# Patient Record
Sex: FEMALE | Race: WHITE | NOT HISPANIC OR LATINO
[De-identification: names, ages, dates, MRNs, and addresses within clinical notes are randomized per-mention and may not be internally consistent; named-entity substitution may affect disease eponyms.]

---

## 2017-03-06 ENCOUNTER — RX RENEWAL (OUTPATIENT)
Age: 82
End: 2017-03-06

## 2017-03-31 ENCOUNTER — RX RENEWAL (OUTPATIENT)
Age: 82
End: 2017-03-31

## 2017-04-03 ENCOUNTER — RX RENEWAL (OUTPATIENT)
Age: 82
End: 2017-04-03

## 2017-05-31 ENCOUNTER — RX RENEWAL (OUTPATIENT)
Age: 82
End: 2017-05-31

## 2017-07-17 ENCOUNTER — EMERGENCY (EMERGENCY)
Facility: HOSPITAL | Age: 82
LOS: 1 days | Discharge: LEFT BEFORE DISCHARGE | End: 2017-07-17
Attending: EMERGENCY MEDICINE | Admitting: EMERGENCY MEDICINE
Payer: MEDICARE

## 2017-07-17 VITALS
RESPIRATION RATE: 16 BRPM | TEMPERATURE: 98 F | DIASTOLIC BLOOD PRESSURE: 78 MMHG | WEIGHT: 117.95 LBS | SYSTOLIC BLOOD PRESSURE: 167 MMHG | OXYGEN SATURATION: 98 % | HEART RATE: 63 BPM

## 2017-07-17 VITALS
DIASTOLIC BLOOD PRESSURE: 77 MMHG | RESPIRATION RATE: 16 BRPM | TEMPERATURE: 98 F | SYSTOLIC BLOOD PRESSURE: 166 MMHG | HEART RATE: 56 BPM | OXYGEN SATURATION: 98 %

## 2017-07-17 DIAGNOSIS — R07.89 OTHER CHEST PAIN: ICD-10-CM

## 2017-07-17 DIAGNOSIS — I10 ESSENTIAL (PRIMARY) HYPERTENSION: ICD-10-CM

## 2017-07-17 DIAGNOSIS — Z86.73 PERSONAL HISTORY OF TRANSIENT ISCHEMIC ATTACK (TIA), AND CEREBRAL INFARCTION WITHOUT RESIDUAL DEFICITS: ICD-10-CM

## 2017-07-17 DIAGNOSIS — Z98.89 OTHER SPECIFIED POSTPROCEDURAL STATES: Chronic | ICD-10-CM

## 2017-07-17 LAB
ALBUMIN SERPL ELPH-MCNC: 3.5 G/DL — SIGNIFICANT CHANGE UP (ref 3.3–5)
ALP SERPL-CCNC: 58 U/L — SIGNIFICANT CHANGE UP (ref 40–120)
ALT FLD-CCNC: 16 U/L — SIGNIFICANT CHANGE UP (ref 12–78)
ANION GAP SERPL CALC-SCNC: 6 MMOL/L — SIGNIFICANT CHANGE UP (ref 5–17)
APTT BLD: 28.5 SEC — SIGNIFICANT CHANGE UP (ref 27.5–37.4)
AST SERPL-CCNC: 17 U/L — SIGNIFICANT CHANGE UP (ref 15–37)
BASOPHILS # BLD AUTO: 0.1 K/UL — SIGNIFICANT CHANGE UP (ref 0–0.2)
BASOPHILS NFR BLD AUTO: 0.8 % — SIGNIFICANT CHANGE UP (ref 0–2)
BILIRUB SERPL-MCNC: 0.4 MG/DL — SIGNIFICANT CHANGE UP (ref 0.2–1.2)
BUN SERPL-MCNC: 20 MG/DL — SIGNIFICANT CHANGE UP (ref 7–23)
CALCIUM SERPL-MCNC: 9.1 MG/DL — SIGNIFICANT CHANGE UP (ref 8.5–10.1)
CHLORIDE SERPL-SCNC: 109 MMOL/L — HIGH (ref 96–108)
CK MB BLD-MCNC: <1.3 % — SIGNIFICANT CHANGE UP (ref 0–3.5)
CK MB CFR SERPL CALC: <0.5 NG/ML — SIGNIFICANT CHANGE UP (ref 0–3.6)
CK SERPL-CCNC: 39 U/L — SIGNIFICANT CHANGE UP (ref 26–192)
CO2 SERPL-SCNC: 26 MMOL/L — SIGNIFICANT CHANGE UP (ref 22–31)
CREAT SERPL-MCNC: 1.2 MG/DL — SIGNIFICANT CHANGE UP (ref 0.5–1.3)
EOSINOPHIL # BLD AUTO: 0.2 K/UL — SIGNIFICANT CHANGE UP (ref 0–0.5)
EOSINOPHIL NFR BLD AUTO: 1.6 % — SIGNIFICANT CHANGE UP (ref 0–6)
GLUCOSE SERPL-MCNC: 103 MG/DL — HIGH (ref 70–99)
HCT VFR BLD CALC: 44.5 % — SIGNIFICANT CHANGE UP (ref 34.5–45)
HGB BLD-MCNC: 14.6 G/DL — SIGNIFICANT CHANGE UP (ref 11.5–15.5)
INR BLD: 0.99 RATIO — SIGNIFICANT CHANGE UP (ref 0.88–1.16)
LYMPHOCYTES # BLD AUTO: 2 K/UL — SIGNIFICANT CHANGE UP (ref 1–3.3)
LYMPHOCYTES # BLD AUTO: 21.8 % — SIGNIFICANT CHANGE UP (ref 13–44)
MCHC RBC-ENTMCNC: 30.3 PG — SIGNIFICANT CHANGE UP (ref 27–34)
MCHC RBC-ENTMCNC: 32.8 GM/DL — SIGNIFICANT CHANGE UP (ref 32–36)
MCV RBC AUTO: 92.5 FL — SIGNIFICANT CHANGE UP (ref 80–100)
MONOCYTES # BLD AUTO: 0.8 K/UL — SIGNIFICANT CHANGE UP (ref 0–0.9)
MONOCYTES NFR BLD AUTO: 8.9 % — SIGNIFICANT CHANGE UP (ref 1–9)
NEUTROPHILS # BLD AUTO: 6.1 K/UL — SIGNIFICANT CHANGE UP (ref 1.8–7.4)
NEUTROPHILS NFR BLD AUTO: 66.9 % — SIGNIFICANT CHANGE UP (ref 43–77)
PLATELET # BLD AUTO: 183 K/UL — SIGNIFICANT CHANGE UP (ref 150–400)
POTASSIUM SERPL-MCNC: 4.2 MMOL/L — SIGNIFICANT CHANGE UP (ref 3.5–5.3)
POTASSIUM SERPL-SCNC: 4.2 MMOL/L — SIGNIFICANT CHANGE UP (ref 3.5–5.3)
PROT SERPL-MCNC: 6.7 G/DL — SIGNIFICANT CHANGE UP (ref 6–8.3)
PROTHROM AB SERPL-ACNC: 10.8 SEC — SIGNIFICANT CHANGE UP (ref 9.8–12.7)
RBC # BLD: 4.81 M/UL — SIGNIFICANT CHANGE UP (ref 3.8–5.2)
RBC # FLD: 13.2 % — SIGNIFICANT CHANGE UP (ref 10.3–14.5)
SODIUM SERPL-SCNC: 141 MMOL/L — SIGNIFICANT CHANGE UP (ref 135–145)
TROPONIN I SERPL-MCNC: <.015 NG/ML — SIGNIFICANT CHANGE UP (ref 0.01–0.04)
WBC # BLD: 9.2 K/UL — SIGNIFICANT CHANGE UP (ref 3.8–10.5)
WBC # FLD AUTO: 9.2 K/UL — SIGNIFICANT CHANGE UP (ref 3.8–10.5)

## 2017-07-17 PROCEDURE — 93005 ELECTROCARDIOGRAM TRACING: CPT

## 2017-07-17 PROCEDURE — 85730 THROMBOPLASTIN TIME PARTIAL: CPT

## 2017-07-17 PROCEDURE — 93010 ELECTROCARDIOGRAM REPORT: CPT

## 2017-07-17 PROCEDURE — 84484 ASSAY OF TROPONIN QUANT: CPT

## 2017-07-17 PROCEDURE — 71010: CPT | Mod: 26

## 2017-07-17 PROCEDURE — 99284 EMERGENCY DEPT VISIT MOD MDM: CPT | Mod: 25

## 2017-07-17 PROCEDURE — 85027 COMPLETE CBC AUTOMATED: CPT

## 2017-07-17 PROCEDURE — 85610 PROTHROMBIN TIME: CPT

## 2017-07-17 PROCEDURE — 99285 EMERGENCY DEPT VISIT HI MDM: CPT

## 2017-07-17 PROCEDURE — 80053 COMPREHEN METABOLIC PANEL: CPT

## 2017-07-17 PROCEDURE — 71045 X-RAY EXAM CHEST 1 VIEW: CPT

## 2017-07-17 PROCEDURE — 82553 CREATINE MB FRACTION: CPT

## 2017-07-17 PROCEDURE — 82550 ASSAY OF CK (CPK): CPT

## 2017-07-17 RX ORDER — SODIUM CHLORIDE 9 MG/ML
3 INJECTION INTRAMUSCULAR; INTRAVENOUS; SUBCUTANEOUS ONCE
Qty: 0 | Refills: 0 | Status: COMPLETED | OUTPATIENT
Start: 2017-07-17 | End: 2017-07-17

## 2017-07-17 RX ORDER — ASPIRIN/CALCIUM CARB/MAGNESIUM 324 MG
325 TABLET ORAL ONCE
Qty: 0 | Refills: 0 | Status: COMPLETED | OUTPATIENT
Start: 2017-07-17 | End: 2017-07-17

## 2017-07-17 RX ADMIN — SODIUM CHLORIDE 3 MILLILITER(S): 9 INJECTION INTRAMUSCULAR; INTRAVENOUS; SUBCUTANEOUS at 12:24

## 2017-07-17 NOTE — ED PROVIDER NOTE - ENMT, MLM
Airway patent, Nasal mucosa clear. Mouth with normal mucosa. Throat has no vesicles, no oropharyngeal exudates and uvula is midline. MM Moist. Non-toxic, well appearing. Neck supple. No meningeal signs.

## 2017-07-17 NOTE — ED ADULT NURSE NOTE - EXPLANATION OF PATIENT'S REASON FOR LEAVING
patient states "I feel better and I don't want to get more tests and I want to go home." patient and daughter of patient educated on the risk of leaving AMA which can result in death and verbalized understanding of this risk. patient states "I feel better and I don't want to get more tests and I want to go home." patient and daughter of patient educated by myself and Dr. Rangel on the risk of leaving AMA which can result in death and verbalized understanding of this risk.

## 2017-07-17 NOTE — ED PROVIDER NOTE - CHPI ED SYMPTOMS NEG
no syncope/no diaphoresis/no shortness of breath/no chills/no fever/no nausea/no dizziness/no cough/no vomiting/no back pain

## 2017-07-17 NOTE — ED ADULT NURSE REASSESSMENT NOTE - NS ED NURSE REASSESS COMMENT FT1
patient denies chest pain, dizziness, palpitations, nausea.   IV intact.   NSR on continuos cardiac monitor.  daughter at bedside. patient ambulated in ED, steady gait noted.

## 2017-07-17 NOTE — ED ADULT NURSE REASSESSMENT NOTE - NS ED NURSE REASSESS COMMENT FT1
patient ambulated to bathroom and back to bed.  patient states "I feel better."   patient denies chest pain, dizziness, palpitatoins, nausea.   IV intact.   NSR on continuos cardiac monitor.  daughter at bedside.

## 2017-07-17 NOTE — ED PROVIDER NOTE - OBJECTIVE STATEMENT
89 yo F p/w developed chest discomfort to mid / upper chest since ~ 7 am today. no fever/chills. No dyspnea. No agg/allev factors. No recent illness. No SOB. No recent LORENZO / easy fatigue. no numb/ting/focal weak. NO agg/allev factors. NO other inj or co.

## 2017-07-17 NOTE — ED PROVIDER NOTE - PROGRESS NOTE DETAILS
Had an at length discussion with patient.  Patient wishes to leave at this time, she will not wait for cardiology consultation or any further tests..  The patient understands that they are leaving against medical advice despite the risk of missing a potentially serious diagnosis which may lead to injury, disability and/or death.  I discussed with the patient which tests would need to be performed and what type of monitoring would be necessary for the patient as well.  I was unable to convince patient to stay for further workup.  The patient was given an opportunity to ask any questions as well.   The patient demonstrates understanding of all risks, is awake and alert and demonstrates competance to make medical decisions.  The patient understands that they may return at any time if desired. Discussed the importance of close, prompt medical follow-up.  Also present at bedside for discussions: Daughter Franchesca

## 2017-07-17 NOTE — ED ADULT NURSE NOTE - OBJECTIVE STATEMENT
89yo female presents to ED alert and oriented X 4.   steady gait noted.  patient c/o "indigestion since 7am." patient c/o sternal chest pain, intermittently since 7am.  patient denies headache, palpitations, blurred vision, fever, chills, cough, dizziness.   respirations even and unlabored.  ekg performed by molly and reviewed by Dr. Rangel.   NSR on [employer] cardiac monitor.   family at bedside.

## 2017-08-28 ENCOUNTER — RX RENEWAL (OUTPATIENT)
Age: 82
End: 2017-08-28

## 2018-06-09 ENCOUNTER — EMERGENCY (EMERGENCY)
Facility: HOSPITAL | Age: 83
LOS: 1 days | Discharge: LEFT BEFORE DISCHARGE | End: 2018-06-09
Admitting: EMERGENCY MEDICINE

## 2018-06-09 VITALS
HEART RATE: 70 BPM | RESPIRATION RATE: 14 BRPM | HEIGHT: 60 IN | TEMPERATURE: 98 F | DIASTOLIC BLOOD PRESSURE: 74 MMHG | OXYGEN SATURATION: 98 % | SYSTOLIC BLOOD PRESSURE: 170 MMHG | WEIGHT: 117.95 LBS

## 2018-06-09 VITALS
SYSTOLIC BLOOD PRESSURE: 154 MMHG | TEMPERATURE: 98 F | OXYGEN SATURATION: 98 % | HEART RATE: 61 BPM | DIASTOLIC BLOOD PRESSURE: 83 MMHG | RESPIRATION RATE: 14 BRPM

## 2018-06-09 DIAGNOSIS — Z98.89 OTHER SPECIFIED POSTPROCEDURAL STATES: Chronic | ICD-10-CM

## 2018-06-09 NOTE — ED ADULT NURSE NOTE - OBJECTIVE STATEMENT
patient presenting to ED with complain of weakness denies pain or any other discomfort. will continue to monitor .

## 2018-06-09 NOTE — ED ADULT NURSE REASSESSMENT NOTE - NS ED NURSE REASSESS COMMENT FT1
patient and family requesting to leave ED, patient verbalized feeling improved, patient encouraged to wait for MD evaluation, patient and family insist on leave. patient seen ambulatory without assist, escorted out of ED by two family members. patient encouraged to return to ED if symptoms persists or return, patient verbalized understanding.

## 2018-06-09 NOTE — ED ADULT NURSE NOTE - CHPI ED SYMPTOMS NEG
no nausea/no tingling/no pain/no chills/no decreased eating/drinking/no dizziness/no vomiting/no numbness/no fever

## 2019-08-01 ENCOUNTER — TRANSCRIPTION ENCOUNTER (OUTPATIENT)
Age: 84
End: 2019-08-01

## 2019-10-17 ENCOUNTER — EMERGENCY (EMERGENCY)
Facility: HOSPITAL | Age: 84
LOS: 1 days | Discharge: HOME | End: 2019-10-17
Attending: EMERGENCY MEDICINE | Admitting: EMERGENCY MEDICINE
Payer: MEDICARE

## 2019-10-17 VITALS
SYSTOLIC BLOOD PRESSURE: 168 MMHG | RESPIRATION RATE: 16 BRPM | TEMPERATURE: 98 F | OXYGEN SATURATION: 98 % | HEART RATE: 77 BPM | DIASTOLIC BLOOD PRESSURE: 88 MMHG

## 2019-10-17 VITALS
OXYGEN SATURATION: 96 % | RESPIRATION RATE: 18 BRPM | HEART RATE: 81 BPM | SYSTOLIC BLOOD PRESSURE: 172 MMHG | WEIGHT: 119.93 LBS | DIASTOLIC BLOOD PRESSURE: 89 MMHG | TEMPERATURE: 98 F

## 2019-10-17 DIAGNOSIS — Z98.89 OTHER SPECIFIED POSTPROCEDURAL STATES: Chronic | ICD-10-CM

## 2019-10-17 LAB
ALBUMIN SERPL ELPH-MCNC: 3.6 G/DL — SIGNIFICANT CHANGE UP (ref 3.3–5)
ALP SERPL-CCNC: 108 U/L — SIGNIFICANT CHANGE UP (ref 40–120)
ALT FLD-CCNC: 19 U/L — SIGNIFICANT CHANGE UP (ref 12–78)
ANION GAP SERPL CALC-SCNC: 8 MMOL/L — SIGNIFICANT CHANGE UP (ref 5–17)
APPEARANCE UR: CLEAR — SIGNIFICANT CHANGE UP
APTT BLD: 31.3 SEC — SIGNIFICANT CHANGE UP (ref 28.5–37)
AST SERPL-CCNC: 32 U/L — SIGNIFICANT CHANGE UP (ref 15–37)
BASOPHILS # BLD AUTO: 0.04 K/UL — SIGNIFICANT CHANGE UP (ref 0–0.2)
BASOPHILS NFR BLD AUTO: 0.3 % — SIGNIFICANT CHANGE UP (ref 0–2)
BILIRUB SERPL-MCNC: 0.9 MG/DL — SIGNIFICANT CHANGE UP (ref 0.2–1.2)
BILIRUB UR-MCNC: NEGATIVE — SIGNIFICANT CHANGE UP
BUN SERPL-MCNC: 20 MG/DL — SIGNIFICANT CHANGE UP (ref 7–23)
CALCIUM SERPL-MCNC: 9.9 MG/DL — SIGNIFICANT CHANGE UP (ref 8.5–10.1)
CHLORIDE SERPL-SCNC: 109 MMOL/L — HIGH (ref 96–108)
CK SERPL-CCNC: 169 U/L — SIGNIFICANT CHANGE UP (ref 26–192)
CO2 SERPL-SCNC: 24 MMOL/L — SIGNIFICANT CHANGE UP (ref 22–31)
COLOR SPEC: YELLOW — SIGNIFICANT CHANGE UP
CREAT SERPL-MCNC: 1.1 MG/DL — SIGNIFICANT CHANGE UP (ref 0.5–1.3)
DIFF PNL FLD: ABNORMAL
EOSINOPHIL # BLD AUTO: 0.04 K/UL — SIGNIFICANT CHANGE UP (ref 0–0.5)
EOSINOPHIL NFR BLD AUTO: 0.3 % — SIGNIFICANT CHANGE UP (ref 0–6)
GLUCOSE SERPL-MCNC: 131 MG/DL — HIGH (ref 70–99)
GLUCOSE UR QL: NEGATIVE — SIGNIFICANT CHANGE UP
HCT VFR BLD CALC: 39.6 % — SIGNIFICANT CHANGE UP (ref 34.5–45)
HGB BLD-MCNC: 12.8 G/DL — SIGNIFICANT CHANGE UP (ref 11.5–15.5)
IMM GRANULOCYTES NFR BLD AUTO: 0.5 % — SIGNIFICANT CHANGE UP (ref 0–1.5)
INR BLD: 1.1 RATIO — SIGNIFICANT CHANGE UP (ref 0.88–1.16)
KETONES UR-MCNC: NEGATIVE — SIGNIFICANT CHANGE UP
LEUKOCYTE ESTERASE UR-ACNC: NEGATIVE — SIGNIFICANT CHANGE UP
LYMPHOCYTES # BLD AUTO: 1.22 K/UL — SIGNIFICANT CHANGE UP (ref 1–3.3)
LYMPHOCYTES # BLD AUTO: 9.4 % — LOW (ref 13–44)
MCHC RBC-ENTMCNC: 28.4 PG — SIGNIFICANT CHANGE UP (ref 27–34)
MCHC RBC-ENTMCNC: 32.3 GM/DL — SIGNIFICANT CHANGE UP (ref 32–36)
MCV RBC AUTO: 87.8 FL — SIGNIFICANT CHANGE UP (ref 80–100)
MONOCYTES # BLD AUTO: 0.95 K/UL — HIGH (ref 0–0.9)
MONOCYTES NFR BLD AUTO: 7.4 % — SIGNIFICANT CHANGE UP (ref 2–14)
NEUTROPHILS # BLD AUTO: 10.6 K/UL — HIGH (ref 1.8–7.4)
NEUTROPHILS NFR BLD AUTO: 82.1 % — HIGH (ref 43–77)
NITRITE UR-MCNC: NEGATIVE — SIGNIFICANT CHANGE UP
NRBC # BLD: 0 /100 WBCS — SIGNIFICANT CHANGE UP (ref 0–0)
PH UR: 6.5 — SIGNIFICANT CHANGE UP (ref 5–8)
PLATELET # BLD AUTO: 257 K/UL — SIGNIFICANT CHANGE UP (ref 150–400)
POTASSIUM SERPL-MCNC: 4.2 MMOL/L — SIGNIFICANT CHANGE UP (ref 3.5–5.3)
POTASSIUM SERPL-SCNC: 4.2 MMOL/L — SIGNIFICANT CHANGE UP (ref 3.5–5.3)
PROT SERPL-MCNC: 7.5 G/DL — SIGNIFICANT CHANGE UP (ref 6–8.3)
PROT UR-MCNC: NEGATIVE — SIGNIFICANT CHANGE UP
PROTHROM AB SERPL-ACNC: 12.5 SEC — SIGNIFICANT CHANGE UP (ref 10–12.9)
RBC # BLD: 4.51 M/UL — SIGNIFICANT CHANGE UP (ref 3.8–5.2)
RBC # FLD: 13.5 % — SIGNIFICANT CHANGE UP (ref 10.3–14.5)
SODIUM SERPL-SCNC: 141 MMOL/L — SIGNIFICANT CHANGE UP (ref 135–145)
SP GR SPEC: 1.01 — SIGNIFICANT CHANGE UP (ref 1.01–1.02)
UROBILINOGEN FLD QL: NEGATIVE — SIGNIFICANT CHANGE UP
WBC # BLD: 12.92 K/UL — HIGH (ref 3.8–10.5)
WBC # FLD AUTO: 12.92 K/UL — HIGH (ref 3.8–10.5)

## 2019-10-17 PROCEDURE — 72170 X-RAY EXAM OF PELVIS: CPT

## 2019-10-17 PROCEDURE — 82550 ASSAY OF CK (CPK): CPT

## 2019-10-17 PROCEDURE — 72125 CT NECK SPINE W/O DYE: CPT

## 2019-10-17 PROCEDURE — 70450 CT HEAD/BRAIN W/O DYE: CPT

## 2019-10-17 PROCEDURE — 70450 CT HEAD/BRAIN W/O DYE: CPT | Mod: 26

## 2019-10-17 PROCEDURE — 87086 URINE CULTURE/COLONY COUNT: CPT

## 2019-10-17 PROCEDURE — 72125 CT NECK SPINE W/O DYE: CPT | Mod: 26

## 2019-10-17 PROCEDURE — 93010 ELECTROCARDIOGRAM REPORT: CPT

## 2019-10-17 PROCEDURE — 73562 X-RAY EXAM OF KNEE 3: CPT

## 2019-10-17 PROCEDURE — 85610 PROTHROMBIN TIME: CPT

## 2019-10-17 PROCEDURE — 71046 X-RAY EXAM CHEST 2 VIEWS: CPT

## 2019-10-17 PROCEDURE — 72170 X-RAY EXAM OF PELVIS: CPT | Mod: 26

## 2019-10-17 PROCEDURE — 71046 X-RAY EXAM CHEST 2 VIEWS: CPT | Mod: 26

## 2019-10-17 PROCEDURE — 93005 ELECTROCARDIOGRAM TRACING: CPT

## 2019-10-17 PROCEDURE — 85027 COMPLETE CBC AUTOMATED: CPT

## 2019-10-17 PROCEDURE — 73562 X-RAY EXAM OF KNEE 3: CPT | Mod: 26,50

## 2019-10-17 PROCEDURE — 99284 EMERGENCY DEPT VISIT MOD MDM: CPT

## 2019-10-17 PROCEDURE — 81001 URINALYSIS AUTO W/SCOPE: CPT

## 2019-10-17 PROCEDURE — 85730 THROMBOPLASTIN TIME PARTIAL: CPT

## 2019-10-17 PROCEDURE — 80053 COMPREHEN METABOLIC PANEL: CPT

## 2019-10-17 PROCEDURE — 99284 EMERGENCY DEPT VISIT MOD MDM: CPT | Mod: 25

## 2019-10-17 PROCEDURE — 36415 COLL VENOUS BLD VENIPUNCTURE: CPT

## 2019-10-17 RX ORDER — NITROFURANTOIN MACROCRYSTAL 50 MG
100 CAPSULE ORAL ONCE
Refills: 0 | Status: COMPLETED | OUTPATIENT
Start: 2019-10-17 | End: 2019-10-17

## 2019-10-17 RX ORDER — NITROFURANTOIN MACROCRYSTAL 50 MG
1 CAPSULE ORAL
Qty: 14 | Refills: 0
Start: 2019-10-17 | End: 2019-10-23

## 2019-10-17 RX ADMIN — Medication 100 MILLIGRAM(S): at 12:51

## 2019-10-17 NOTE — ED ADULT NURSE NOTE - OBJECTIVE STATEMENT
received pt in bed #6b Pt alert to name & place BIBA from donald Pt found sleeping on floor w/ pillow & blanket. Pt states she just feels weak As per daughters pt w/ worsening dementia over the last couple of months. pt w/ scab left elbow & bruise right upper back Pt seen by RAGINI Mijares Will monitor

## 2019-10-17 NOTE — ED PROVIDER NOTE - NSFOLLOWUPINSTRUCTIONS_ED_ALL_ED_FT
Follow up with your Primary Care Physician within the next 2-3 days  Bring a copy of your test results with you to your appointment  Continue your current medication regimen  Return to the Emergency Room if you experience new or worsening symptoms  stay hydrated  take macrobid 2x per day for 1 week

## 2019-10-17 NOTE — ED PROVIDER NOTE - OBJECTIVE STATEMENT
89 y/o female comes in from assisted living with no complaints. patient was supposed to attent PT this morning and did not arrive so facility staff went to patients room and found her on the floor with pillow and blanket reporting she had just decided to sleep on the bed. patient nemesio a fall, denies pain. per daughters who speak with her multiple times per day the patient has been acting herself although note her dementia to be progressing more rapidly over the last few months. no recent illness.

## 2019-10-17 NOTE — ED PROVIDER NOTE - CONSTITUTIONAL, MLM
normal... Well appearing, well nourished, awake, alert, oriented to person, NOT place, time/situation, in no apparent distress.

## 2019-10-17 NOTE — ED ADULT NURSE NOTE - PMH
Hypertension    Macular degeneration    Melanoma    Stroke Dementia    Hypertension    Macular degeneration    Melanoma    Stroke

## 2019-10-17 NOTE — ED PROVIDER NOTE - SKIN, MLM
small area of ecchymosis over the right scapula, appears fairly new, nontender over area and scapula

## 2019-10-17 NOTE — ED PROVIDER NOTE - CLINICAL SUMMARY MEDICAL DECISION MAKING FREE TEXT BOX
89 y/o female found on floor of assisted living. has been in usual state of health per family but months of worsening dementia. patient well appearing with small area of bruising over right scapula without tenderness. alert, following commands without obvious focal deficit. will obtain labs, cth and neck, xrays chest, pelvis and knees and reasses - Maryana Rivera PA-C

## 2019-10-17 NOTE — ED PROVIDER NOTE - PATIENT PORTAL LINK FT
You can access the FollowMyHealth Patient Portal offered by [facility] by registering at the following website: [email or web address]. By joining [facility]’s FollowMyHealth portal, you will also be able to view your health information using other applications (apps) compatible with our system.

## 2019-10-17 NOTE — ED ADULT NURSE NOTE - NSIMPLEMENTINTERV_GEN_ALL_ED
Implemented All Fall Risk Interventions:  [place] to call system. Call bell, personal items and telephone within reach. Instruct patient to call for assistance. Room bathroom lighting operational. Non-slip footwear when patient is off stretcher. Physically safe environment: no spills, clutter or unnecessary equipment. Stretcher in lowest position, wheels locked, appropriate side rails in place. Provide visual cue, wrist band, yellow gown, etc. Monitor gait and stability. Monitor for mental status changes and reorient to person, place, and time. Review medications for side effects contributing to fall risk. Reinforce activity limits and safety measures with patient and family.

## 2019-10-17 NOTE — ED PROVIDER NOTE - ATTENDING CONTRIBUTION TO CARE
Pt is a 89 yo female who presents to the ED with a cc of increased confusion.  PMHx of Dementia    Hypertension, macular degeneration, melanoma, h/o CVA.  Pt family reports that pt confusion has been worsening over the last several months.  Today when she did not arrive for her PT session the staff at the facility went to check on her and found her on the ground with a blanket and pillow.  Pt was unsure how she came to be laying on the ground.  Denies pain.  Was sent to the ED for further work up.  Pt alert to person and place but unsure of year and unable to recall present events.  On exam pt lying in bed NAD, NCAT, PERRL, EOMI, heart RRR, lungs CTA, abd soft NT/ND.  No joint TTP on exam.  No midline C/T/L TTP on exam.  Healing contusion noted to right scapular region.  Will obtain screening labs, x-rays and CT head.  Will monitor pt.  Agree with above plan of care

## 2019-10-17 NOTE — ED PROVIDER NOTE - NEUROLOGICAL, MLM
Alert and oriented, following commands, strength 5/5 in all 4 extremities, CN II- XII intact, no pronator drift

## 2019-10-18 LAB
CULTURE RESULTS: SIGNIFICANT CHANGE UP
SPECIMEN SOURCE: SIGNIFICANT CHANGE UP

## 2020-05-05 ENCOUNTER — OUTPATIENT (OUTPATIENT)
Dept: OUTPATIENT SERVICES | Facility: HOSPITAL | Age: 85
LOS: 1 days | Discharge: STILL ADMITTED OR ADMINISTRATIVE | End: 2020-05-05
Payer: MEDICARE

## 2020-05-05 DIAGNOSIS — Z98.89 OTHER SPECIFIED POSTPROCEDURAL STATES: Chronic | ICD-10-CM

## 2020-05-05 DIAGNOSIS — F03.91 UNSPECIFIED DEMENTIA WITH BEHAVIORAL DISTURBANCE: ICD-10-CM

## 2020-05-05 PROBLEM — F03.90 UNSPECIFIED DEMENTIA WITHOUT BEHAVIORAL DISTURBANCE: Chronic | Status: ACTIVE | Noted: 2019-10-17

## 2020-05-21 ENCOUNTER — OUTPATIENT (OUTPATIENT)
Dept: OUTPATIENT SERVICES | Facility: HOSPITAL | Age: 85
LOS: 1 days | Discharge: HOME | End: 2020-05-21
Payer: MEDICARE

## 2020-05-21 DIAGNOSIS — Z98.89 OTHER SPECIFIED POSTPROCEDURAL STATES: Chronic | ICD-10-CM

## 2020-05-21 PROCEDURE — 99443: CPT | Mod: 95

## 2020-05-22 DIAGNOSIS — H35.30 UNSPECIFIED MACULAR DEGENERATION: ICD-10-CM

## 2020-05-22 DIAGNOSIS — I10 ESSENTIAL (PRIMARY) HYPERTENSION: ICD-10-CM

## 2020-05-22 DIAGNOSIS — E78.5 HYPERLIPIDEMIA, UNSPECIFIED: ICD-10-CM

## 2020-05-22 DIAGNOSIS — F03.91 UNSPECIFIED DEMENTIA WITH BEHAVIORAL DISTURBANCE: ICD-10-CM

## 2020-07-17 NOTE — ED PROVIDER NOTE - TEMPLATE
A couple of options:  1. Can send her to the [facility] testing center, she or we would need to call to set up an appointment for her, I can place the order. From the website:  Community testing is focused on people with at least one COVID-19 symptom or who believe  they have been exposed to COVID-19 in their household or through significant exposure at work  or in public, regardless of their ability to pay. All patients must be pre-registered; providers may  also refer patients directly for testing and place orders. There is no out-of-pocket cost to the  patient for the test. If the patient has insurance, [employer] will be billed. If they do  not have insurance, [employer] will cover the cost. Patients can register by calling [phone] or visiting [email or web address]. 2. There is free/no apt necessary drive up testing that will happen July 28-29th at [facility] from 10am-6pm.    3. I heard from Dr. Grey Kim regarding rapid testing done at [facility]. Not sure if this is local or if she would have to go to [place]. Radha Hilton, can you shed some light on this. Let me know what she chooses to do. Given that her daughter is not my patient, I cannot place orders for her.      Mila Duran
COVID-19 SCREENING    Do you or any of your household members have the following symptoms:  Fever >100.4*F or >38.0*C: No    New or worsening cough, shortness of breath, or sore throat: No    New onset of nausea, vomiting or diarrhea: No    New onset of loss of taste or smell, chills, repeated shaking with chills, muscle pain, or headache: No    Have you, a household member, or another person you have been in contact with tested positive for COVID-19 in the last 14 days?: No    Emergent COVID-19 Symptoms requiring Nurse Triage:  Trouble breathing, Persistent pain or pressure in the chest, New confusion, Inability to wake or stay awake, Bluish lips or face    If any of the above questions are positive and the patient is not having emergent symptoms - order COVID-19 lab test, register, & schedule patient for community testing. Testing can also be ordered on asymptomatic individuals who have been instructed by a public health agency or a health care provider to get a COVID-19 test for the purpose of preventing the spread of infection ([place])    DO NOT schedule any other appointments unless directed by a facility or provider (ex:  being discharged from hospital needing 1st pediatric appointment, TIA clinic patients, etc). Appointments can be canceled, but DO NOT schedule/reschedule patient requested appointments.
Dr Stacy Mena, please review the note below.
I called Deann and gave her the number for the [place] test site so she can schedule a test, the public sites run by the [employer] have taken weeks in some cases to get results to people so I told her this would probably be the fastest way to be tested so she can get up to see her .
Noted, thank you Shante Jefferson.
Patients  has been admitted into [facility] and in order for patient to be part of her husbands care, she needs to have a negative COVID test and daughter, Marylu Walters is asking if there is a way they can get orders put in for a rapid COVID test.  Please call back as soon as possible. Patient is with daughter, Marylu Walters.
Cardiac

## 2020-07-22 PROCEDURE — 99214 OFFICE O/P EST MOD 30 MIN: CPT

## 2020-08-17 ENCOUNTER — INPATIENT (INPATIENT)
Facility: HOSPITAL | Age: 85
LOS: 1 days | Discharge: HOME | DRG: 312 | End: 2020-08-19
Attending: HOSPITALIST | Admitting: HOSPITALIST
Payer: MEDICARE

## 2020-08-17 VITALS
DIASTOLIC BLOOD PRESSURE: 55 MMHG | RESPIRATION RATE: 18 BRPM | SYSTOLIC BLOOD PRESSURE: 97 MMHG | HEIGHT: 62 IN | WEIGHT: 100.09 LBS | TEMPERATURE: 98 F | OXYGEN SATURATION: 98 % | HEART RATE: 53 BPM

## 2020-08-17 DIAGNOSIS — Z98.89 OTHER SPECIFIED POSTPROCEDURAL STATES: Chronic | ICD-10-CM

## 2020-08-17 LAB
ALBUMIN SERPL ELPH-MCNC: 4 G/DL — SIGNIFICANT CHANGE UP (ref 3.3–5)
ALP SERPL-CCNC: 73 U/L — SIGNIFICANT CHANGE UP (ref 40–120)
ALT FLD-CCNC: 7 U/L — LOW (ref 10–45)
ANION GAP SERPL CALC-SCNC: 11 MMOL/L — SIGNIFICANT CHANGE UP (ref 5–17)
AST SERPL-CCNC: 17 U/L — SIGNIFICANT CHANGE UP (ref 10–40)
BASOPHILS # BLD AUTO: 0.04 K/UL — SIGNIFICANT CHANGE UP (ref 0–0.2)
BASOPHILS NFR BLD AUTO: 0.5 % — SIGNIFICANT CHANGE UP (ref 0–2)
BILIRUB SERPL-MCNC: 0.2 MG/DL — SIGNIFICANT CHANGE UP (ref 0.2–1.2)
BUN SERPL-MCNC: 22 MG/DL — SIGNIFICANT CHANGE UP (ref 7–23)
CALCIUM SERPL-MCNC: 10 MG/DL — SIGNIFICANT CHANGE UP (ref 8.4–10.5)
CHLORIDE SERPL-SCNC: 107 MMOL/L — SIGNIFICANT CHANGE UP (ref 96–108)
CO2 SERPL-SCNC: 24 MMOL/L — SIGNIFICANT CHANGE UP (ref 22–31)
CREAT SERPL-MCNC: 1.52 MG/DL — HIGH (ref 0.5–1.3)
EOSINOPHIL # BLD AUTO: 0.22 K/UL — SIGNIFICANT CHANGE UP (ref 0–0.5)
EOSINOPHIL NFR BLD AUTO: 2.5 % — SIGNIFICANT CHANGE UP (ref 0–6)
GLUCOSE SERPL-MCNC: 151 MG/DL — HIGH (ref 70–99)
HCT VFR BLD CALC: 43.5 % — SIGNIFICANT CHANGE UP (ref 34.5–45)
HGB BLD-MCNC: 13.5 G/DL — SIGNIFICANT CHANGE UP (ref 11.5–15.5)
IMM GRANULOCYTES NFR BLD AUTO: 0.5 % — SIGNIFICANT CHANGE UP (ref 0–1.5)
LIDOCAIN IGE QN: 73 U/L — HIGH (ref 7–60)
LYMPHOCYTES # BLD AUTO: 2.15 K/UL — SIGNIFICANT CHANGE UP (ref 1–3.3)
LYMPHOCYTES # BLD AUTO: 24.7 % — SIGNIFICANT CHANGE UP (ref 13–44)
MCHC RBC-ENTMCNC: 28.6 PG — SIGNIFICANT CHANGE UP (ref 27–34)
MCHC RBC-ENTMCNC: 31 GM/DL — LOW (ref 32–36)
MCV RBC AUTO: 92.2 FL — SIGNIFICANT CHANGE UP (ref 80–100)
MONOCYTES # BLD AUTO: 0.66 K/UL — SIGNIFICANT CHANGE UP (ref 0–0.9)
MONOCYTES NFR BLD AUTO: 7.6 % — SIGNIFICANT CHANGE UP (ref 2–14)
NEUTROPHILS # BLD AUTO: 5.6 K/UL — SIGNIFICANT CHANGE UP (ref 1.8–7.4)
NEUTROPHILS NFR BLD AUTO: 64.2 % — SIGNIFICANT CHANGE UP (ref 43–77)
NRBC # BLD: 0 /100 WBCS — SIGNIFICANT CHANGE UP (ref 0–0)
PLATELET # BLD AUTO: 177 K/UL — SIGNIFICANT CHANGE UP (ref 150–400)
POTASSIUM SERPL-MCNC: 4.3 MMOL/L — SIGNIFICANT CHANGE UP (ref 3.5–5.3)
POTASSIUM SERPL-SCNC: 4.3 MMOL/L — SIGNIFICANT CHANGE UP (ref 3.5–5.3)
PROT SERPL-MCNC: 6.1 G/DL — SIGNIFICANT CHANGE UP (ref 6–8.3)
RBC # BLD: 4.72 M/UL — SIGNIFICANT CHANGE UP (ref 3.8–5.2)
RBC # FLD: 13.4 % — SIGNIFICANT CHANGE UP (ref 10.3–14.5)
SODIUM SERPL-SCNC: 142 MMOL/L — SIGNIFICANT CHANGE UP (ref 135–145)
TROPONIN T, HIGH SENSITIVITY RESULT: 10 NG/L — SIGNIFICANT CHANGE UP (ref 0–51)
TROPONIN T, HIGH SENSITIVITY RESULT: 11 NG/L — SIGNIFICANT CHANGE UP (ref 0–51)
WBC # BLD: 8.71 K/UL — SIGNIFICANT CHANGE UP (ref 3.8–10.5)
WBC # FLD AUTO: 8.71 K/UL — SIGNIFICANT CHANGE UP (ref 3.8–10.5)

## 2020-08-17 PROCEDURE — 93010 ELECTROCARDIOGRAM REPORT: CPT

## 2020-08-17 PROCEDURE — 99285 EMERGENCY DEPT VISIT HI MDM: CPT | Mod: CS

## 2020-08-17 PROCEDURE — 99223 1ST HOSP IP/OBS HIGH 75: CPT

## 2020-08-17 PROCEDURE — 73502 X-RAY EXAM HIP UNI 2-3 VIEWS: CPT | Mod: 26,RT

## 2020-08-17 PROCEDURE — 99497 ADVNCD CARE PLAN 30 MIN: CPT | Mod: 25

## 2020-08-17 PROCEDURE — 70450 CT HEAD/BRAIN W/O DYE: CPT | Mod: 26

## 2020-08-17 PROCEDURE — 71045 X-RAY EXAM CHEST 1 VIEW: CPT | Mod: 26

## 2020-08-17 RX ORDER — QUETIAPINE FUMARATE 200 MG/1
25 TABLET, FILM COATED ORAL ONCE
Refills: 0 | Status: DISCONTINUED | OUTPATIENT
Start: 2020-08-17 | End: 2020-08-17

## 2020-08-17 RX ORDER — SODIUM CHLORIDE 9 MG/ML
500 INJECTION INTRAMUSCULAR; INTRAVENOUS; SUBCUTANEOUS ONCE
Refills: 0 | Status: COMPLETED | OUTPATIENT
Start: 2020-08-17 | End: 2020-08-17

## 2020-08-17 RX ORDER — FAMOTIDINE 10 MG/ML
20 INJECTION INTRAVENOUS ONCE
Refills: 0 | Status: DISCONTINUED | OUTPATIENT
Start: 2020-08-17 | End: 2020-08-17

## 2020-08-17 RX ORDER — FAMOTIDINE 10 MG/ML
20 INJECTION INTRAVENOUS ONCE
Refills: 0 | Status: COMPLETED | OUTPATIENT
Start: 2020-08-17 | End: 2020-08-17

## 2020-08-17 RX ADMIN — SODIUM CHLORIDE 500 MILLILITER(S): 9 INJECTION INTRAMUSCULAR; INTRAVENOUS; SUBCUTANEOUS at 23:35

## 2020-08-17 RX ADMIN — FAMOTIDINE 20 MILLIGRAM(S): 10 INJECTION INTRAVENOUS at 22:57

## 2020-08-17 NOTE — ED ADULT NURSE NOTE - OBJECTIVE STATEMENT
pt alert and oriented to person and place but not time, pt reports she is confused as to why she was sent to the hospital, pt reports she has no complaints and feels at her baseline.  pt reports she was brought in from a "community center".  pt is speaking full clear sentences, respirations non-labored, skin warm dry and intact, strong pulses throughout, abd is soft and non-distended. pt moving all extremities spontaneously.

## 2020-08-17 NOTE — ED PROVIDER NOTE - CLINICAL SUMMARY MEDICAL DECISION MAKING FREE TEXT BOX
Syncope a/w CP and vomiting in elderly pt c dementia.  Broad ddx including acute coronary syndrome, dissection, PE, GERD/gastritis, pancreatitis, pna/pnx, medication side effect/misadventure.  Less inclined to believe CVA or infection.  Will need ekg, labs including trop/lipase, CT-A C/A/P, CT head, likely admission.  --BMM Syncope a/w CP and vomiting in elderly pt c dementia.  Broad ddx including acute coronary syndrome, dissection, PE, GERD/gastritis, pancreatitis, pna/pnx, medication side effect/misadventure.  Less inclined to believe CVA or infection.  Patient states she has R hip pain though no falls witnessed, raising c/f dissection.  Will need ekg, labs including trop/lipase, CT-A C/A/P, CT head, likely admission.  --BMM Syncope a/w CP and vomiting in elderly pt c dementia.  Broad ddx including acute coronary syndrome, dissection, PE, GERD/gastritis, pancreatitis, pna/pnx, medication side effect/misadventure.  Less inclined to believe CVA or infection.  Pt has epigastric tenderness to palpation on exam without any other PE findings.  Patient states she has R hip pain though no falls witnessed, somewhat raising c/f dissection though b/l BPs symmetric and no c/o pain currently..  Will need ekg, labs including trop/lipase, CT-A C/A/P, CT head, likely admission.  --BMM

## 2020-08-17 NOTE — ED PROVIDER NOTE - PROGRESS NOTE DETAILS
Hx provided via phone by pt's daughter Franchesca -- [phone]  89 y F h/o HTN, hyperlipidemia, dementia, normal state of health otherwise witnessed tonight to have 1 e/o CP/epigastric pain (reached for chest, said "something's wrong") and syncope x 2 min while sitting on couch.  Normal state of health prior.  No falls associated.  Spontaneous recovery of consciousness, no sz-like activity.  +e/o vomiting/dry heaving x 1 after episode occurred.  States had some pain in chest.  No ams.  Took lopressor 25 x 1, aricept, pravistatin, mertazipine/seroquel earlier in the evening.  No h/o syncope in the past.  Specifies patient is a full code.  --BMM

## 2020-08-17 NOTE — ED PROVIDER NOTE - OBJECTIVE STATEMENT
89yo F with PMH HTN, HLD, dementia BIBEMS from home s/p syncopal episode prior to arrival. History mainly obtained from patient's daughter over phone.  Pt was sitting home watching TV when she complained suddenly of epigastric pain prior to syncopizing, + LOC for approx 2 mins. Did not fall or hit head. When pt woke up she had 1 episode of vomiting. Pt is A&O x 2 to person and place, but reports "I was watching TV and I don't know why I'm here, my daughter made me come." Pt reports RLE pain, no other complaints. No current CP/abd pain/n/v.

## 2020-08-17 NOTE — ED ADULT NURSE NOTE - NSIMPLEMENTINTERV_GEN_ALL_ED
Implemented All Fall with Harm Risk Interventions:  [place] to call system. Call bell, personal items and telephone within reach. Instruct patient to call for assistance. Room bathroom lighting operational. Non-slip footwear when patient is off stretcher. Physically safe environment: no spills, clutter or unnecessary equipment. Stretcher in lowest position, wheels locked, appropriate side rails in place. Provide visual cue, wrist band, yellow gown, etc. Monitor gait and stability. Monitor for mental status changes and reorient to person, place, and time. Review medications for side effects contributing to fall risk. Reinforce activity limits and safety measures with patient and family. Provide visual clues: red socks.

## 2020-08-18 DIAGNOSIS — R55 SYNCOPE AND COLLAPSE: ICD-10-CM

## 2020-08-18 DIAGNOSIS — G30.9 ALZHEIMER'S DISEASE, UNSPECIFIED: ICD-10-CM

## 2020-08-18 DIAGNOSIS — Z71.89 OTHER SPECIFIED COUNSELING: ICD-10-CM

## 2020-08-18 DIAGNOSIS — N17.9 ACUTE KIDNEY FAILURE, UNSPECIFIED: ICD-10-CM

## 2020-08-18 DIAGNOSIS — N39.0 URINARY TRACT INFECTION, SITE NOT SPECIFIED: ICD-10-CM

## 2020-08-18 DIAGNOSIS — I63.89 OTHER CEREBRAL INFARCTION: ICD-10-CM

## 2020-08-18 DIAGNOSIS — Z29.9 ENCOUNTER FOR PROPHYLACTIC MEASURES, UNSPECIFIED: ICD-10-CM

## 2020-08-18 DIAGNOSIS — R11.2 NAUSEA WITH VOMITING, UNSPECIFIED: ICD-10-CM

## 2020-08-18 DIAGNOSIS — I10 ESSENTIAL (PRIMARY) HYPERTENSION: ICD-10-CM

## 2020-08-18 LAB
ANION GAP SERPL CALC-SCNC: 12 MMOL/L — SIGNIFICANT CHANGE UP (ref 5–17)
APPEARANCE UR: CLEAR — SIGNIFICANT CHANGE UP
BACTERIA # UR AUTO: NEGATIVE — SIGNIFICANT CHANGE UP
BILIRUB UR-MCNC: NEGATIVE — SIGNIFICANT CHANGE UP
BUN SERPL-MCNC: 20 MG/DL — SIGNIFICANT CHANGE UP (ref 7–23)
CALCIUM SERPL-MCNC: 10.3 MG/DL — SIGNIFICANT CHANGE UP (ref 8.4–10.5)
CHLORIDE SERPL-SCNC: 108 MMOL/L — SIGNIFICANT CHANGE UP (ref 96–108)
CO2 SERPL-SCNC: 24 MMOL/L — SIGNIFICANT CHANGE UP (ref 22–31)
COLOR SPEC: SIGNIFICANT CHANGE UP
CREAT SERPL-MCNC: 1.31 MG/DL — HIGH (ref 0.5–1.3)
DIFF PNL FLD: NEGATIVE — SIGNIFICANT CHANGE UP
EPI CELLS # UR: 3 /HPF — SIGNIFICANT CHANGE UP
GLUCOSE SERPL-MCNC: 112 MG/DL — HIGH (ref 70–99)
GLUCOSE UR QL: NEGATIVE — SIGNIFICANT CHANGE UP
HCT VFR BLD CALC: 43.9 % — SIGNIFICANT CHANGE UP (ref 34.5–45)
HGB BLD-MCNC: 14.1 G/DL — SIGNIFICANT CHANGE UP (ref 11.5–15.5)
HYALINE CASTS # UR AUTO: 3 /LPF — HIGH (ref 0–2)
KETONES UR-MCNC: NEGATIVE — SIGNIFICANT CHANGE UP
LEUKOCYTE ESTERASE UR-ACNC: ABNORMAL
MCHC RBC-ENTMCNC: 29.1 PG — SIGNIFICANT CHANGE UP (ref 27–34)
MCHC RBC-ENTMCNC: 32.1 GM/DL — SIGNIFICANT CHANGE UP (ref 32–36)
MCV RBC AUTO: 90.5 FL — SIGNIFICANT CHANGE UP (ref 80–100)
NITRITE UR-MCNC: NEGATIVE — SIGNIFICANT CHANGE UP
NRBC # BLD: 0 /100 WBCS — SIGNIFICANT CHANGE UP (ref 0–0)
PH UR: 6 — SIGNIFICANT CHANGE UP (ref 5–8)
PLATELET # BLD AUTO: 165 K/UL — SIGNIFICANT CHANGE UP (ref 150–400)
POTASSIUM SERPL-MCNC: 4.7 MMOL/L — SIGNIFICANT CHANGE UP (ref 3.5–5.3)
POTASSIUM SERPL-SCNC: 4.7 MMOL/L — SIGNIFICANT CHANGE UP (ref 3.5–5.3)
PROT UR-MCNC: NEGATIVE — SIGNIFICANT CHANGE UP
RBC # BLD: 4.85 M/UL — SIGNIFICANT CHANGE UP (ref 3.8–5.2)
RBC # FLD: 13.6 % — SIGNIFICANT CHANGE UP (ref 10.3–14.5)
RBC CASTS # UR COMP ASSIST: 2 /HPF — SIGNIFICANT CHANGE UP (ref 0–4)
SARS-COV-2 IGG SERPL QL IA: NEGATIVE — SIGNIFICANT CHANGE UP
SARS-COV-2 IGM SERPL IA-ACNC: <3.8 AU/ML — SIGNIFICANT CHANGE UP
SARS-COV-2 RNA SPEC QL NAA+PROBE: SIGNIFICANT CHANGE UP
SODIUM SERPL-SCNC: 144 MMOL/L — SIGNIFICANT CHANGE UP (ref 135–145)
SP GR SPEC: 1.01 — LOW (ref 1.01–1.02)
UROBILINOGEN FLD QL: NEGATIVE — SIGNIFICANT CHANGE UP
WBC # BLD: 12.01 K/UL — HIGH (ref 3.8–10.5)
WBC # FLD AUTO: 12.01 K/UL — HIGH (ref 3.8–10.5)
WBC UR QL: 54 /HPF — HIGH (ref 0–5)

## 2020-08-18 PROCEDURE — 99233 SBSQ HOSP IP/OBS HIGH 50: CPT

## 2020-08-18 PROCEDURE — 74019 RADEX ABDOMEN 2 VIEWS: CPT | Mod: 26

## 2020-08-18 RX ORDER — CEFTRIAXONE 500 MG/1
1000 INJECTION, POWDER, FOR SOLUTION INTRAMUSCULAR; INTRAVENOUS ONCE
Refills: 0 | Status: COMPLETED | OUTPATIENT
Start: 2020-08-18 | End: 2020-08-18

## 2020-08-18 RX ORDER — MEMANTINE HYDROCHLORIDE 10 MG/1
10 TABLET ORAL
Refills: 0 | Status: DISCONTINUED | OUTPATIENT
Start: 2020-08-18 | End: 2020-08-19

## 2020-08-18 RX ORDER — METOPROLOL TARTRATE 50 MG
12.5 TABLET ORAL
Refills: 0 | Status: DISCONTINUED | OUTPATIENT
Start: 2020-08-18 | End: 2020-08-18

## 2020-08-18 RX ORDER — CEFTRIAXONE 500 MG/1
INJECTION, POWDER, FOR SOLUTION INTRAMUSCULAR; INTRAVENOUS
Refills: 0 | Status: DISCONTINUED | OUTPATIENT
Start: 2020-08-18 | End: 2020-08-19

## 2020-08-18 RX ORDER — SODIUM CHLORIDE 9 MG/ML
1000 INJECTION, SOLUTION INTRAVENOUS
Refills: 0 | Status: DISCONTINUED | OUTPATIENT
Start: 2020-08-18 | End: 2020-08-18

## 2020-08-18 RX ORDER — ATORVASTATIN CALCIUM 80 MG/1
20 TABLET, FILM COATED ORAL AT BEDTIME
Refills: 0 | Status: DISCONTINUED | OUTPATIENT
Start: 2020-08-18 | End: 2020-08-19

## 2020-08-18 RX ORDER — POLYETHYLENE GLYCOL 3350 17 G/17G
17 POWDER, FOR SOLUTION ORAL DAILY
Refills: 0 | Status: DISCONTINUED | OUTPATIENT
Start: 2020-08-18 | End: 2020-08-19

## 2020-08-18 RX ORDER — DONEPEZIL HYDROCHLORIDE 10 MG/1
10 TABLET, FILM COATED ORAL AT BEDTIME
Refills: 0 | Status: DISCONTINUED | OUTPATIENT
Start: 2020-08-18 | End: 2020-08-19

## 2020-08-18 RX ORDER — QUETIAPINE FUMARATE 200 MG/1
12.5 TABLET, FILM COATED ORAL AT BEDTIME
Refills: 0 | Status: DISCONTINUED | OUTPATIENT
Start: 2020-08-18 | End: 2020-08-19

## 2020-08-18 RX ORDER — ENOXAPARIN SODIUM 100 MG/ML
30 INJECTION SUBCUTANEOUS DAILY
Refills: 0 | Status: DISCONTINUED | OUTPATIENT
Start: 2020-08-18 | End: 2020-08-19

## 2020-08-18 RX ORDER — CEFTRIAXONE 500 MG/1
1000 INJECTION, POWDER, FOR SOLUTION INTRAMUSCULAR; INTRAVENOUS EVERY 24 HOURS
Refills: 0 | Status: DISCONTINUED | OUTPATIENT
Start: 2020-08-19 | End: 2020-08-19

## 2020-08-18 RX ORDER — LANOLIN ALCOHOL/MO/W.PET/CERES
1 CREAM (GRAM) TOPICAL AT BEDTIME
Refills: 0 | Status: DISCONTINUED | OUTPATIENT
Start: 2020-08-18 | End: 2020-08-19

## 2020-08-18 RX ORDER — METOPROLOL TARTRATE 50 MG
25 TABLET ORAL
Refills: 0 | Status: DISCONTINUED | OUTPATIENT
Start: 2020-08-18 | End: 2020-08-19

## 2020-08-18 RX ORDER — MIRTAZAPINE 45 MG/1
15 TABLET, ORALLY DISINTEGRATING ORAL AT BEDTIME
Refills: 0 | Status: DISCONTINUED | OUTPATIENT
Start: 2020-08-18 | End: 2020-08-19

## 2020-08-18 RX ADMIN — MEMANTINE HYDROCHLORIDE 10 MILLIGRAM(S): 10 TABLET ORAL at 18:50

## 2020-08-18 RX ADMIN — MEMANTINE HYDROCHLORIDE 10 MILLIGRAM(S): 10 TABLET ORAL at 06:26

## 2020-08-18 RX ADMIN — POLYETHYLENE GLYCOL 3350 17 GRAM(S): 17 POWDER, FOR SOLUTION ORAL at 14:18

## 2020-08-18 RX ADMIN — SODIUM CHLORIDE 75 MILLILITER(S): 9 INJECTION, SOLUTION INTRAVENOUS at 02:30

## 2020-08-18 RX ADMIN — QUETIAPINE FUMARATE 12.5 MILLIGRAM(S): 200 TABLET, FILM COATED ORAL at 21:59

## 2020-08-18 RX ADMIN — Medication 1 MILLIGRAM(S): at 21:59

## 2020-08-18 RX ADMIN — Medication 12.5 MILLIGRAM(S): at 06:26

## 2020-08-18 RX ADMIN — CEFTRIAXONE 100 MILLIGRAM(S): 500 INJECTION, POWDER, FOR SOLUTION INTRAMUSCULAR; INTRAVENOUS at 18:50

## 2020-08-18 RX ADMIN — DONEPEZIL HYDROCHLORIDE 10 MILLIGRAM(S): 10 TABLET, FILM COATED ORAL at 21:59

## 2020-08-18 RX ADMIN — MIRTAZAPINE 15 MILLIGRAM(S): 45 TABLET, ORALLY DISINTEGRATING ORAL at 21:59

## 2020-08-18 RX ADMIN — ATORVASTATIN CALCIUM 20 MILLIGRAM(S): 80 TABLET, FILM COATED ORAL at 21:59

## 2020-08-18 RX ADMIN — Medication 25 MILLIGRAM(S): at 17:35

## 2020-08-18 NOTE — H&P ADULT - PROBLEM SELECTOR PLAN 2
sCr 52 at admission; unknown baseline but last sCr at 1.1; in setting of N/V prior to admission  Received IVF bolus in ED  Will gently hydrate  Follow up BMP in am

## 2020-08-18 NOTE — PROGRESS NOTE ADULT - PROBLEM SELECTOR PLAN 8
DVT ppx w/ enoxaprin  Fall precautions  Delirium precautions: at high risk for delirium; Frequent reorientation, ensure pt's hearing aids are in use, avoid unnecessary tethers, avoid benzos and anticholinergics, lights on in daytime, off in nighttime

## 2020-08-18 NOTE — H&P ADULT - PROBLEM SELECTOR PROBLEM 6
ACP (advance care planning) Alzheimer's dementia with behavioral disturbance, unspecified timing of dementia onset

## 2020-08-18 NOTE — H&P ADULT - PROBLEM SELECTOR PLAN 5
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

## 2020-08-18 NOTE — H&P ADULT - PROBLEM SELECTOR PLAN 3
Presented w/ hypotension and bradycardia; improved after IVF though remains with HR in 50s (unclear baseline); will restart metop tartate at half home dose (metop tartate 12.5 mg bid) w/ holding parameters: SBP<100 and HR<50 Presented w/ hypotension and bradycardia; self resolved and then received IVF; remains with HR in 50s (unclear baseline); will restart metop tartate at half home dose (metop tartate 12.5 mg bid) w/ holding parameters: SBP<100 and HR<50 Episode of N/V at home; tender to abdominal palpation on initial exam per ED notes; non-tender on my examination; nausea currently improved  - Will obtain Abdomen XR to further assess- stool burden vs blockage

## 2020-08-18 NOTE — H&P ADULT - NSHPSOCIALHISTORY_GEN_ALL_CORE
Never smoker    Since Covid 19, left independent living place  Now lives with daughters Franchesca and Selene (alternatives)    Independent with transfers, walking w/ walker; dependent in some ADLs (dressing, bathing, toileting/grooming, medications) and iADLs  Hx of Dementia w/ hallucinations Never smoker    Since Covid 19, has left independent living place  Now lives with daughters Franchesca and Selene (alternatives between)    Independent with transfers, walking w/ walker; dependent in some ADLs (dressing, bathing, toileting/grooming, medications) and iADLs  Hx of Dementia w/ hallucinations

## 2020-08-18 NOTE — PROGRESS NOTE ADULT - PROBLEM SELECTOR PLAN 7
Associated w/ hallucinations  Continue namenda 10 mg BID and aricept 10 mg qd  continue quetiapine 12.5 mg at bedtime

## 2020-08-18 NOTE — H&P ADULT - PROBLEM SELECTOR PLAN 6
Discussed code status w/ daughter - Prefer Full Code Associated w/ hallucinations  Continue namenda 10 mg BID and aricept 10 mg qd  continue quetiapine 12.5 mg at bedtime

## 2020-08-18 NOTE — H&P ADULT - PROBLEM SELECTOR PLAN 8
DVT ppx w/ enoxaprin  Fall precautions DVT ppx w/ enoxaprin  Fall precautions  Delirium precautions: at high risk for delirium; Frequent reorientation, ensure pt's hearing aids are in use, avoid unnecessary tethers, avoid benzos and anticholinergics, lights on in daytime, off in nighttime

## 2020-08-18 NOTE — PROGRESS NOTE ADULT - PROBLEM SELECTOR PROBLEM 5
Truvada (PrEP) initial consultation completed and shipment confirmed. [facility] will ship [employer] on 4/16 via [employer] for delivery on 4/17. Copay $0.00 - 004. Med list and allergies reviewed and updated in Epic. Pt is not new to treatment and declined full consultation. Pt has been on PrEP for over 2 years. Advised pt of [facility] services. [facility] will call pt for refills when he has approximately 7 days on hand.    Essential hypertension

## 2020-08-18 NOTE — H&P ADULT - ASSESSMENT
89yo F with PMH HTN, HLD, dementia BIBEMS from home s/p syncopal episode prior to arrival.  History mainly obtained from patient's daughter over phone.  Pt was sitting home watching TV when she complained suddenly of epigastric or chest pain prior to synopsizing with LOC for approx 2 - 3 mins. 89yo F with PMH HTN, HLD, dementia BIBEMS from home s/p syncopal episode prior to arrival.  History mainly obtained from patient's daughter over phone.  Pt was sitting home watching TV when she complained suddenly of epigastric or chest pain prior to synopsizing with LOC for approx 2 - 3 mins. Admitted for further workup of syncope and abdominal discomfort. 89yo F with PMH HTN, HLD, dementia BIBEMS from home s/p syncopal episode prior to arrival.  History mainly obtained from patient's daughter over phone.  Pt was sitting home watching TV when she complained suddenly of epigastric or chest pain prior to synopsizing with LOC for approx 2 - 3 mins. Admitted for further workup of syncope.

## 2020-08-18 NOTE — H&P ADULT - NSHPPHYSICALEXAM_GEN_ALL_CORE
PHYSICAL EXAM:   GENERAL: Alert. AO x1-2. No acute distress. Not thin. Not cachectic. Not obese.  HEAD:  Atraumatic. Normocephalic.  EYES: EOMI. PERRLA. Normal conjunctiva/sclera.  ENT: Neck supple. Moist oral mucosa. Not edentulous. No thrush.  LYMPH: Normal supraclavicular/cervical lymph nodes.   CARDIAC: Regular rhythm. Regular rhythm. Not irregularly irregular. S1. S2. No murmur. No rub. No distant heart sounds.  LUNG/CHEST: CTAB. BS equal bilaterally. No wheezes. No rales. No rhonchi.  ABDOMEN: Soft. No tenderness. No distension. No fluid wave. Normal bowel sounds.  BACK: No midline/vertebral tenderness. No flank tenderness.  VASCULAR: +2 b/l radial pulses. Palpable DP pulses.  EXTREMITIES:  No clubbing. No cyanosis. No edema. Moving all 4.  NEUROLOGY: Non-focal exam. Cranial nerves intact. Normal speech. Sensation intact.  PSYCH: Normal behavior. Normal affect.  SKIN: No jaundice. No erythema. No rash/lesion.  Vascular Access:     Vital Signs Last 24 Hrs  T(C): 37 (17 Aug 2020 23:36), Max: 37 (17 Aug 2020 23:36)  T(F): 98.6 (17 Aug 2020 23:36), Max: 98.6 (17 Aug 2020 23:36)  HR: 56 (18 Aug 2020 00:25) (52 - 56)  BP: 159/76 (18 Aug 2020 00:25) (97/55 - 159/76)  BP(mean): --  RR: 19 (18 Aug 2020 00:25) (16 - 19)  SpO2: 97% (18 Aug 2020 00:25) (97% - 100%)    I&O's Summary

## 2020-08-18 NOTE — PROGRESS NOTE ADULT - PROBLEM SELECTOR PLAN 5
Presented w/ hypotension and bradycardia; self resolved and then received IVF; remains with HR in 50s (unclear baseline)  Will resume back on home dose 25mg BID given WCT

## 2020-08-18 NOTE — H&P ADULT - PROBLEM SELECTOR PLAN 1
As above, noted to have LOC 2-3 min we preceeding chest/abdominal pain; no prior episodes, associated w/ N/V x1.  Lipase mildly elevated but no pain on physical exam; CT brain w/o acute abnorm  - Follow up CT chest/abdomen  - Monitor on Tele As above, noted to have LOC 2-3 min we preceeding chest/abdominal pain; no prior episodes, associated w/ N/V x1.  Lipase mildly elevated but no pain on physical exam; CT brain w/o acute abnorm, CXR w/o signs of infection; Trop HS neg x2; EKG w/ sinus eunice, QW in V1 and V2, no acute ST elevations or depressions  - Monitor on Tele  - Orthostatic BP and HR

## 2020-08-18 NOTE — H&P ADULT - NSICDXFAMILYHX_GEN_ALL_CORE_FT
FAMILY HISTORY:  No pertinent family history in first degree relatives FAMILY HISTORY:  No family history of cerebrovascular accident (CVA)

## 2020-08-18 NOTE — PROGRESS NOTE ADULT - PROBLEM SELECTOR PLAN 1
As above, noted to have LOC 2-3 min we preceeding chest/abdominal pain; no prior episodes, associated w/ N/V x1.  Lipase mildly elevated but no pain on physical exam; CT brain w/o acute abnorm, CXR w/o signs of infection; Trop HS neg x2; EKG w/ sinus eunice, QW in V1 and V2, no acute ST elevations or depressions  - Monitor on Tele  - Orthostatic BP and HR

## 2020-08-18 NOTE — H&P ADULT - HISTORY OF PRESENT ILLNESS
Patient's daugher Franchesca note tht they were sitting on the couch when the patient noted she did not feel well and touched center of her chest and went unresponsive. The patient was looking at her (eyes open) but was not responding. This lasted 2*3 minutes and then went back to normal. Sje was drooling.  She also noted to EMS that she ws not feeling and was nauseated. She did not vomit. Had clear spit up. Did not urinate her pants. Patient did not recall episode. No focal deficits after she returned.   No prior occurence of same.  CVA 15 years ago. No residual weakness. 89 year old female with a PMH of dementia w/ behav disturbances (hallucinations), hemorrhagic CVA, hypertension, and macular dengeneration who presents to the ED after a witnessed syncopal episode.   Patient appears well but confused. She notes her children felt she was unwell and brought her "here." She was unable to identify she is at the hospital. History limited regarding event but at present patient denies any pain or discomfort. Denies nausea. Denies urinary pain or increased frequency.     Collateral obtained from patient's daughter, Franchesca. She endorses that they were sitting on the couch watching TV when the patient was stated she did not feel well and touched center of her chest and then suddenly went unresponsive. The patient was looking at her (eyes open) but was not responding. This lasted 2-3 minutes and then patient went back to normal and said "what was I saying." Franchesca notes patient was drooling during the episode. Did not fall or hit head. Franchesca also notes patient stated to EMS that she is nauseated. She did not vomit but had "clear spit up." Franchesca denies micturition or known tongue biting during episode. No focal deficits after she returned.   Patient did not recall episode of passing out. No prior occurrence of same. Denies any complaints from patient during the day.

## 2020-08-18 NOTE — H&P ADULT - NSHPLABSRESULTS_GEN_ALL_CORE
Personally reviewed labs.   Personally reviewed imaging.   Personally reviewed EKG.                             13.5   8.71  )-----------( 177      ( 17 Aug 2020 21:47 )               43.5       08-17    142  |  107  |  22  ----------------------------<  151<H>  4.3   |  24  |  1.52<H>    Ca    10.0      17 Aug 2020 21:47    TPro  6.1  /  Alb  4.0  /  TBili  0.2  /  DBili  x   /  AST  17  /  ALT  7<L>  /  AlkPhos  73  08-17      LIVER FUNCTIONS - ( 17 Aug 2020 21:47 )  Alb: 4.0 g/dL / Pro: 6.1 g/dL / ALK PHOS: 73 U/L / ALT: 7 U/L / AST: 17 U/L / GGT: x               CT head:  IMPRESSION:  No CT evidence of acute intracranial hemorrhage, mass effect or acute territorial infarct.  Cerebral volume loss and chronic ischemic changes are grossly stable from 10/17/2019.      CXR: clear lungs    XR HIP W PELV:   INTERPRETATION:  No acute fracture or dislocation at the hip.

## 2020-08-18 NOTE — H&P ADULT - NSHPREVIEWOFSYSTEMS_GEN_ALL_CORE
losartan 100 mg daily  metoprolol tartate 25 mg bid  dementia: namenda 10 mg bid  aricept 10 mg qd  atorva 20 mg qd qhs  mirtazapine 15 mg qhs  quetiapine 25 mg 1/2 tab at night  melatonin 1 mg qhs Limited due to patient's dementia.  REVIEW OF SYSTEMS:  CONSTITUTIONAL: No weakness. No fevers. No chills. No rigors. No weight loss. No night sweats. No poor appetite.  EYES: No blurry or double vision. No eye pain.  ENT: +hearing aids; No vertigo. No dysphagia. No sore throat. No Sinusitis/rhinorrhea.   NECK: No pain. No stiffness/rigidity.  CARDIAC: No chest pain. No palpitations. No lightheadedness.    RESPIRATORY: No cough. No SOB.    GASTROINTESTINAL: +nausea. No abdominal pain. No vomiting. No diarrhea. No constipation. No melena. No hematochezia.  GENITOURINARY: No dysuria. No frequency. No hesitancy. No hematuria. No oliguria.  NEUROLOGICAL: No numbness/tingling. No focal weakness. No urinary or fecal incontinence. No headache. No unsteady gait.  BACK: No back pain. No flank pain.  EXTREMITIES: No lower extremity edema. Full ROM. No joint pain.  SKIN: No rashes. No itching. No other lesions.  PSYCHIATRIC: No depression. No anxiety. No SI/HI.    All other review of systems is negative unless indicated above.

## 2020-08-18 NOTE — PROGRESS NOTE ADULT - ASSESSMENT
89yo F with PMH HTN, HLD, dementia BIBEMS from home s/p syncopal episode prior to arrival.  History mainly obtained from patient's daughter over phone.  Pt was sitting home watching TV when she complained suddenly of epigastric or chest pain prior to synopsizing with LOC for approx 2 - 3 mins. Admitted for further workup of syncope.

## 2020-08-18 NOTE — PROGRESS NOTE ADULT - PROBLEM SELECTOR PLAN 4
Episode of N/V at home; tender to abdominal palpation on initial exam per ED notes; non-tender on my examination; nausea currently improved  - Will obtain Abdomen XR to further assess- stool burden vs blockage

## 2020-08-18 NOTE — H&P ADULT - COMMENTS
losartan 100 mg daily  metoprolol tartate 25 mg bid  dementia: namenda 10 mg bid  aricept 10 mg qd  atorva 20 mg qd qhs  mirtazapine 15 mg qhs  quetiapine 25 mg 1/2 tab at night  melatonin 1 mg qhs

## 2020-08-18 NOTE — PROGRESS NOTE ADULT - PROBLEM SELECTOR PLAN 3
Cr elevated on admission to 1.5 . per daughter in June around 1.3.  Improved. Will gently hydrate  Follow up BMP in am

## 2020-08-18 NOTE — H&P ADULT - PROBLEM SELECTOR PLAN 4
Prior hx of hemorrhagic stroke 15 yrs ago  Continue atorvastatin Presented w/ hypotension and bradycardia; self resolved and then received IVF; remains with HR in 50s (unclear baseline); will restart metop tartate at half home dose (metop tartate 12.5 mg bid) w/ holding parameters: SBP<100 and HR<50

## 2020-08-18 NOTE — PROGRESS NOTE ADULT - PROBLEM SELECTOR PLAN 2
UA + with LE and WBC.   Patient denies any urinary symptoms but poor historian  d/w daughter at length over phone and bedside. no specific symptoms prior.   Given increased WBC today and UA, altered MS would start Abx.  start Ceftriaxone. check UCx.

## 2020-08-18 NOTE — CHART NOTE - NSCHARTNOTEFT_GEN_A_CORE
Notified by RN of pt noted to have 5 beats WCT up 100s. Pt asymptomatic. Pt seen and examined. Pt AAOx1, poor historian. Dr. Monaco notified, will continue to monitor.     Brad Marr NP  [id number]

## 2020-08-19 ENCOUNTER — TRANSCRIPTION ENCOUNTER (OUTPATIENT)
Age: 85
End: 2020-08-19

## 2020-08-19 VITALS
TEMPERATURE: 98 F | OXYGEN SATURATION: 96 % | DIASTOLIC BLOOD PRESSURE: 70 MMHG | RESPIRATION RATE: 18 BRPM | HEART RATE: 49 BPM | SYSTOLIC BLOOD PRESSURE: 166 MMHG

## 2020-08-19 LAB
ANION GAP SERPL CALC-SCNC: 10 MMOL/L — SIGNIFICANT CHANGE UP (ref 5–17)
BUN SERPL-MCNC: 19 MG/DL — SIGNIFICANT CHANGE UP (ref 7–23)
CALCIUM SERPL-MCNC: 9.9 MG/DL — SIGNIFICANT CHANGE UP (ref 8.4–10.5)
CHLORIDE SERPL-SCNC: 111 MMOL/L — HIGH (ref 96–108)
CO2 SERPL-SCNC: 23 MMOL/L — SIGNIFICANT CHANGE UP (ref 22–31)
CREAT SERPL-MCNC: 1.37 MG/DL — HIGH (ref 0.5–1.3)
CULTURE RESULTS: SIGNIFICANT CHANGE UP
GLUCOSE SERPL-MCNC: 102 MG/DL — HIGH (ref 70–99)
HCT VFR BLD CALC: 39.1 % — SIGNIFICANT CHANGE UP (ref 34.5–45)
HGB BLD-MCNC: 12.7 G/DL — SIGNIFICANT CHANGE UP (ref 11.5–15.5)
MCHC RBC-ENTMCNC: 29.3 PG — SIGNIFICANT CHANGE UP (ref 27–34)
MCHC RBC-ENTMCNC: 32.5 GM/DL — SIGNIFICANT CHANGE UP (ref 32–36)
MCV RBC AUTO: 90.3 FL — SIGNIFICANT CHANGE UP (ref 80–100)
NRBC # BLD: 0 /100 WBCS — SIGNIFICANT CHANGE UP (ref 0–0)
PLATELET # BLD AUTO: 162 K/UL — SIGNIFICANT CHANGE UP (ref 150–400)
POTASSIUM SERPL-MCNC: 4.1 MMOL/L — SIGNIFICANT CHANGE UP (ref 3.5–5.3)
POTASSIUM SERPL-SCNC: 4.1 MMOL/L — SIGNIFICANT CHANGE UP (ref 3.5–5.3)
RBC # BLD: 4.33 M/UL — SIGNIFICANT CHANGE UP (ref 3.8–5.2)
RBC # FLD: 13.5 % — SIGNIFICANT CHANGE UP (ref 10.3–14.5)
SODIUM SERPL-SCNC: 144 MMOL/L — SIGNIFICANT CHANGE UP (ref 135–145)
SPECIMEN SOURCE: SIGNIFICANT CHANGE UP
WBC # BLD: 8.4 K/UL — SIGNIFICANT CHANGE UP (ref 3.8–10.5)
WBC # FLD AUTO: 8.4 K/UL — SIGNIFICANT CHANGE UP (ref 3.8–10.5)

## 2020-08-19 PROCEDURE — 80048 BASIC METABOLIC PNL TOTAL CA: CPT

## 2020-08-19 PROCEDURE — 99285 EMERGENCY DEPT VISIT HI MDM: CPT | Mod: 25

## 2020-08-19 PROCEDURE — 73502 X-RAY EXAM HIP UNI 2-3 VIEWS: CPT

## 2020-08-19 PROCEDURE — 71045 X-RAY EXAM CHEST 1 VIEW: CPT

## 2020-08-19 PROCEDURE — 84484 ASSAY OF TROPONIN QUANT: CPT

## 2020-08-19 PROCEDURE — 85027 COMPLETE CBC AUTOMATED: CPT

## 2020-08-19 PROCEDURE — 96374 THER/PROPH/DIAG INJ IV PUSH: CPT

## 2020-08-19 PROCEDURE — 81001 URINALYSIS AUTO W/SCOPE: CPT

## 2020-08-19 PROCEDURE — 93005 ELECTROCARDIOGRAM TRACING: CPT

## 2020-08-19 PROCEDURE — 99239 HOSP IP/OBS DSCHRG MGMT >30: CPT

## 2020-08-19 PROCEDURE — 86769 SARS-COV-2 COVID-19 ANTIBODY: CPT

## 2020-08-19 PROCEDURE — 87086 URINE CULTURE/COLONY COUNT: CPT

## 2020-08-19 PROCEDURE — 70450 CT HEAD/BRAIN W/O DYE: CPT

## 2020-08-19 PROCEDURE — 74019 RADEX ABDOMEN 2 VIEWS: CPT

## 2020-08-19 RX ORDER — MEMANTINE HYDROCHLORIDE 10 MG/1
0 TABLET ORAL
Qty: 0 | Refills: 0 | DISCHARGE

## 2020-08-19 RX ORDER — DONEPEZIL HYDROCHLORIDE 10 MG/1
1 TABLET, FILM COATED ORAL
Qty: 0 | Refills: 0 | DISCHARGE
Start: 2020-08-19

## 2020-08-19 RX ORDER — METOPROLOL TARTRATE 50 MG
1 TABLET ORAL
Qty: 0 | Refills: 0 | DISCHARGE
Start: 2020-08-19

## 2020-08-19 RX ORDER — MIRTAZAPINE 45 MG/1
1 TABLET, ORALLY DISINTEGRATING ORAL
Qty: 0 | Refills: 0 | DISCHARGE

## 2020-08-19 RX ORDER — MEMANTINE HYDROCHLORIDE 10 MG/1
1 TABLET ORAL
Qty: 0 | Refills: 0 | DISCHARGE
Start: 2020-08-19

## 2020-08-19 RX ORDER — MIRTAZAPINE 45 MG/1
1 TABLET, ORALLY DISINTEGRATING ORAL
Qty: 0 | Refills: 0 | DISCHARGE
Start: 2020-08-19

## 2020-08-19 RX ORDER — ATORVASTATIN CALCIUM 80 MG/1
1 TABLET, FILM COATED ORAL
Qty: 0 | Refills: 0 | DISCHARGE
Start: 2020-08-19

## 2020-08-19 RX ORDER — MOXIFLOXACIN HYDROCHLORIDE TABLETS, 400 MG 400 MG/1
1 TABLET, FILM COATED ORAL
Qty: 2 | Refills: 0
Start: 2020-08-19 | End: 2020-08-19

## 2020-08-19 RX ORDER — LANOLIN ALCOHOL/MO/W.PET/CERES
1 CREAM (GRAM) TOPICAL
Qty: 0 | Refills: 0 | DISCHARGE

## 2020-08-19 RX ORDER — DONEPEZIL HYDROCHLORIDE 10 MG/1
1 TABLET, FILM COATED ORAL
Qty: 0 | Refills: 0 | DISCHARGE

## 2020-08-19 RX ORDER — LANOLIN ALCOHOL/MO/W.PET/CERES
1 CREAM (GRAM) TOPICAL
Qty: 0 | Refills: 0 | DISCHARGE
Start: 2020-08-19

## 2020-08-19 RX ORDER — METOPROLOL TARTRATE 50 MG
1 TABLET ORAL
Qty: 0 | Refills: 0 | DISCHARGE

## 2020-08-19 RX ORDER — ATORVASTATIN CALCIUM 80 MG/1
1 TABLET, FILM COATED ORAL
Qty: 0 | Refills: 0 | DISCHARGE

## 2020-08-19 RX ADMIN — ENOXAPARIN SODIUM 30 MILLIGRAM(S): 100 INJECTION SUBCUTANEOUS at 11:09

## 2020-08-19 RX ADMIN — CEFTRIAXONE 100 MILLIGRAM(S): 500 INJECTION, POWDER, FOR SOLUTION INTRAMUSCULAR; INTRAVENOUS at 11:09

## 2020-08-19 RX ADMIN — Medication 25 MILLIGRAM(S): at 05:22

## 2020-08-19 RX ADMIN — MEMANTINE HYDROCHLORIDE 10 MILLIGRAM(S): 10 TABLET ORAL at 05:22

## 2020-08-19 NOTE — DISCHARGE NOTE PROVIDER - HOSPITAL COURSE
89yo F with PMH HTN, HLD, dementia BIBEMS from home s/p syncopal episode prior to arrival.  History mainly obtained from patient's daughter over phone.  Pt was sitting home watching TV when she complained suddenly of epigastric or chest pain prior to synopsizing with LOC for approx 2 - 3 mins. Admitted for further workup of syncope.            ·  Problem: Syncope and collapse.  Plan: As above, noted to have LOC 2-3 min we preceeding chest/abdominal pain; no prior episodes, associated w/ N/V x1.  Lipase mildly elevated but no pain on physical exam; CT brain w/o acute abnorm, CXR w/o signs of infection; Trop HS neg x2; EKG w/ sinus eunice, QW in V1 and V2, no acute ST elevations or depressions    -            ·  Problem: Urinary tract infection without hematuria, site unspecified.  Plan: UA + with LE and WBC.     Patient denies any urinary symptoms but poor historian    d/w daughter at length over phone and bedside. no specific symptoms prior.     Given increased WBC today and UA, altered MS would start Abx.    start Ceftriaxone. check UCx.             ·  Problem: MUNDO (acute kidney injury).  Plan: Cr elevated on admission to 1.5 . per daughter in June around 1.3.    Improved. Will gently hydrate        ·  Problem: Intractable vomiting with nausea.  Plan: Episode of N/V at home; tender to abdominal palpation on initial exam per ED notes; non-tender on my examination; nausea currently improved    Abd xray negative            ·  Problem: Essential hypertension.  Plan: Presented w/ hypotension and bradycardia; self resolved and then received IVF; remains with HR in 50s (unclear baseline)    Will resume back on home dose 25mg BID given WCT.             Problem: Cerebrovascular accident (CVA) due to other mechanism. Plan: Prior hx of hemorrhagic stroke 15 yrs ago    Continue atorvastatin.            ·  Problem: Alzheimer's dementia with behavioral disturbance, unspecified timing of dementia onset.  Plan: Associated w/ hallucinations    Continue namenda 10 mg BID and aricept 10 mg qd    continue quetiapine 12.5 mg at bedtime.         Pt stable dc home with outpatient follow up with PMD and psychiatry.

## 2020-08-19 NOTE — DISCHARGE NOTE NURSING/CASE MANAGEMENT/SOCIAL WORK - NSDCPEPTSTRK_GEN_ALL_CORE
Need for follow up after discharge/Prescribed medications/Risk factors for stroke/Stroke warning signs and symptoms/Signs and symptoms of stroke/Stroke education booklet/Stroke support groups for patients, families, and friends/Call 911 for stroke

## 2020-08-19 NOTE — DISCHARGE NOTE PROVIDER - NSDCFUADDAPPT_GEN_ALL_CORE_FT
Please take your antibiotic for 1 more day  Follow up with your PMD and Psychiatry as an outpatient.

## 2020-08-19 NOTE — PROGRESS NOTE ADULT - ASSESSMENT
89yo F with PMH HTN, HLD, dementia BIBEMS from home s/p syncopal episode prior to arrival.  History mainly obtained from patient's daughter over phone.  Pt was sitting home watching TV when she complained suddenly of epigastric or chest pain prior to synopsizing with LOC for approx 2 - 3 mins. Admitted for further workup of syncope.

## 2020-08-19 NOTE — DISCHARGE NOTE PROVIDER - NSDCCPCAREPLAN_GEN_ALL_CORE_FT
PRINCIPAL DISCHARGE DIAGNOSIS  Diagnosis: Syncope and collapse  Assessment and Plan of Treatment: HOME CARE INSTRUCTIONS  Have someone stay with you until you feel stable.  Do not drive, operate machinery, or play sports until your caregiver says it is okay.  Keep all follow-up appointments as directed by your caregiver.   Lie down right away if you start feeling like you might faint. Breathe deeply and steadily. Wait until all the symptoms have passed.Drink enough fluids to keep your urine clear or pale yellow.  If you are taking blood pressure or heart medicine, get up slowly, taking several minutes to sit and then stand. This can reduce dizziness.  SEEK IMMEDIATE MEDICAL CARE IF:  You have a severe headache.  You have unusual pain in the chest, abdomen, or back.  You are bleeding from the mouth or rectum, or you have black or tarry stool.  You have an irregular or very fast heartbeat.  You have pain with breathing.  You have repeated fainting or seizure-like jerking during an episode.  You faint when sitting or lying down.  You have confusion.  You have difficulty walking.  You have severe weakness.  You have vision problems.  If you fainted, call your local emergency services. Do not drive yourself to the hospital        SECONDARY DISCHARGE DIAGNOSES  Diagnosis: Essential hypertension  Assessment and Plan of Treatment: Low salt diet  Activity as tolerated.  Take all medication as prescribed.  Follow up with your medical doctor for routine blood pressure monitoring at your next visit.  Notify your doctor if you have any of the following symptoms:   Dizziness, Lightheadedness, Blurry vision, Headache, Chest pain, Shortness of breath      Diagnosis: MUNDO (acute kidney injury)  Assessment and Plan of Treatment: Stable and resolved.    Diagnosis: Urinary tract infection without hematuria, site unspecified  Assessment and Plan of Treatment: You had a bladder &/or kidney infection  Call your doctor with pain or burning with urination, frequent urination, feeling to urinate suddenly, blood in urine, fever, back pain, nausea or vomiting  You need to take and finish your antibiotic as prescribed so that the infection does not reoccur  Drink adequate fluids - there is no harm to try cranberry juice  Females need to urinate right after sex PRINCIPAL DISCHARGE DIAGNOSIS  Diagnosis: Syncope and collapse  Assessment and Plan of Treatment: HOME CARE INSTRUCTIONS  Have someone stay with you until you feel stable.  Do not drive, operate machinery, or play sports until your caregiver says it is okay.  Keep all follow-up appointments as directed by your caregiver.   Lie down right away if you start feeling like you might faint. Breathe deeply and steadily. Wait until all the symptoms have passed.Drink enough fluids to keep your urine clear or pale yellow.  If you are taking blood pressure or heart medicine, get up slowly, taking several minutes to sit and then stand. This can reduce dizziness.  SEEK IMMEDIATE MEDICAL CARE IF:  You have a severe headache.  You have unusual pain in the chest, abdomen, or back.  You are bleeding from the mouth or rectum, or you have black or tarry stool.  You have an irregular or very fast heartbeat.  You have pain with breathing.  You have repeated fainting or seizure-like jerking during an episode.  You faint when sitting or lying down.  You have confusion.  You have difficulty walking.  You have severe weakness.  You have vision problems.  If you fainted, call your local emergency services. Do not drive yourself to the hospital        SECONDARY DISCHARGE DIAGNOSES  Diagnosis: Essential hypertension  Assessment and Plan of Treatment: Low salt diet  Activity as tolerated.  Take all medication as prescribed.  Follow up with your medical doctor for routine blood pressure monitoring at your next visit.  Notify your doctor if you have any of the following symptoms:   Dizziness, Lightheadedness, Blurry vision, Headache, Chest pain, Shortness of breath      Diagnosis: MUNDO (acute kidney injury)  Assessment and Plan of Treatment: Stable and resolved.    Diagnosis: Urinary tract infection without hematuria, site unspecified  Assessment and Plan of Treatment: You had a bladder &/or kidney infection  Call your doctor with pain or burning with urination, frequent urination, feeling to urinate suddenly, blood in urine, fever, back pain, nausea or vomiting  You need to take and finish your antibiotic as prescribed so that the infection does not reoccur  Drink adequate fluids - there is no harm to try cranberry juice

## 2020-08-19 NOTE — DISCHARGE NOTE PROVIDER - CARE PROVIDER_API CALL
Osiris Wong  PSYCHIATRY  [address] [address]  Phone: [phone]  Fax: [phone]  Follow Up Time:

## 2020-08-19 NOTE — PROGRESS NOTE ADULT - PROBLEM SELECTOR PLAN 4
Episode of N/V at home; tender to abdominal palpation on initial exam per ED notes; non-tender on my examination; nausea currently improved  - xray neg

## 2020-08-19 NOTE — PROGRESS NOTE ADULT - PROBLEM SELECTOR PLAN 1
noted to have LOC 2-3 min we preceeding chest/abdominal pain; no prior episodes, associated w/ N/V x1.  Lipase mildly elevated but no pain on physical exam; CT brain w/o acute abnorm, CXR w/o signs of infection; Trop HS neg x2; EKG w/ sinus eunice, QW in V1 and V2, no acute ST elevations or depressions  - no events on tele  - Orthostatic BP and HR neg

## 2020-08-19 NOTE — DISCHARGE NOTE PROVIDER - NSDCMRMEDTOKEN_GEN_ALL_CORE_FT
atorvastatin 20 mg oral tablet: 1 tab(s) orally once a day (at bedtime)  Cipro 500 mg oral tablet: 1 tab(s) orally every 12 hours   donepezil 10 mg oral tablet: 1 tab(s) orally once a day (at bedtime)  melatonin 1 mg oral tablet: 1 tab(s) orally once a day (at bedtime)  memantine 10 mg oral tablet: 1 tab(s) orally 2 times a day  metoprolol tartrate 25 mg oral tablet: 1 tab(s) orally 2 times a day  mirtazapine 15 mg oral tablet: 1 tab(s) orally once a day (at bedtime)  QUEtiapine 25 mg oral tablet: 12.5 milligram(s) orally once a day (at bedtime)

## 2020-08-19 NOTE — DISCHARGE NOTE NURSING/CASE MANAGEMENT/SOCIAL WORK - PATIENT PORTAL LINK FT
You can access the FollowMyHealth Patient Portal offered by [facility] by registering at the following website: [email or web address]. By joining [facility]’s FollowMyHealth portal, you will also be able to view your health information using other applications (apps) compatible with our system.

## 2020-08-19 NOTE — PROGRESS NOTE ADULT - PROBLEM SELECTOR PLAN 2
UA + with LE and WBC.   Patient denies any urinary symptoms but poor historian  d/w daughter at length over phone and bedside. no specific symptoms prior.   Given increased WBC today and UA, altered MS would start Abx.  cont Ceftriaxone. can swoitch to cipro on d/c f/u UCx.

## 2020-09-17 PROCEDURE — 99214 OFFICE O/P EST MOD 30 MIN: CPT

## 2020-11-02 PROCEDURE — 99442: CPT | Mod: 95

## 2020-12-28 PROCEDURE — 99214 OFFICE O/P EST MOD 30 MIN: CPT

## 2021-02-08 PROCEDURE — 99214 OFFICE O/P EST MOD 30 MIN: CPT | Mod: 95

## 2021-03-09 PROCEDURE — 99215 OFFICE O/P EST HI 40 MIN: CPT | Mod: 95

## 2021-06-14 ENCOUNTER — EMERGENCY (EMERGENCY)
Facility: HOSPITAL | Age: 86
LOS: 1 days | Discharge: HOME | End: 2021-06-14
Attending: STUDENT IN AN ORGANIZED HEALTH CARE EDUCATION/TRAINING PROGRAM | Admitting: INTERNAL MEDICINE
Payer: MEDICARE

## 2021-06-14 VITALS
SYSTOLIC BLOOD PRESSURE: 156 MMHG | DIASTOLIC BLOOD PRESSURE: 84 MMHG | OXYGEN SATURATION: 98 % | RESPIRATION RATE: 20 BRPM | HEIGHT: 62 IN | HEART RATE: 78 BPM | WEIGHT: 139.99 LBS

## 2021-06-14 DIAGNOSIS — R07.89 OTHER CHEST PAIN: ICD-10-CM

## 2021-06-14 DIAGNOSIS — Z98.89 OTHER SPECIFIED POSTPROCEDURAL STATES: Chronic | ICD-10-CM

## 2021-06-14 LAB
ALBUMIN SERPL ELPH-MCNC: 4 G/DL — SIGNIFICANT CHANGE UP (ref 3.3–5)
ALP SERPL-CCNC: 88 U/L — SIGNIFICANT CHANGE UP (ref 40–120)
ALT FLD-CCNC: 12 U/L — SIGNIFICANT CHANGE UP (ref 10–45)
ANION GAP SERPL CALC-SCNC: 15 MMOL/L — SIGNIFICANT CHANGE UP (ref 5–17)
APPEARANCE UR: CLEAR — SIGNIFICANT CHANGE UP
APTT BLD: 22.5 SEC — LOW (ref 27.5–35.5)
AST SERPL-CCNC: 25 U/L — SIGNIFICANT CHANGE UP (ref 10–40)
BACTERIA # UR AUTO: NEGATIVE — SIGNIFICANT CHANGE UP
BASOPHILS # BLD AUTO: 0.07 K/UL — SIGNIFICANT CHANGE UP (ref 0–0.2)
BASOPHILS NFR BLD AUTO: 0.7 % — SIGNIFICANT CHANGE UP (ref 0–2)
BILIRUB SERPL-MCNC: 0.3 MG/DL — SIGNIFICANT CHANGE UP (ref 0.2–1.2)
BILIRUB UR-MCNC: NEGATIVE — SIGNIFICANT CHANGE UP
BUN SERPL-MCNC: 24 MG/DL — HIGH (ref 7–23)
CALCIUM SERPL-MCNC: 10.2 MG/DL — SIGNIFICANT CHANGE UP (ref 8.4–10.5)
CHLORIDE SERPL-SCNC: 108 MMOL/L — SIGNIFICANT CHANGE UP (ref 96–108)
CO2 SERPL-SCNC: 18 MMOL/L — LOW (ref 22–31)
COLOR SPEC: YELLOW — SIGNIFICANT CHANGE UP
CREAT SERPL-MCNC: 1.38 MG/DL — HIGH (ref 0.5–1.3)
DIFF PNL FLD: NEGATIVE — SIGNIFICANT CHANGE UP
EOSINOPHIL # BLD AUTO: 0.24 K/UL — SIGNIFICANT CHANGE UP (ref 0–0.5)
EOSINOPHIL NFR BLD AUTO: 2.2 % — SIGNIFICANT CHANGE UP (ref 0–6)
EPI CELLS # UR: 2 /HPF — SIGNIFICANT CHANGE UP
GLUCOSE SERPL-MCNC: 97 MG/DL — SIGNIFICANT CHANGE UP (ref 70–99)
GLUCOSE UR QL: NEGATIVE — SIGNIFICANT CHANGE UP
HCT VFR BLD CALC: 43.4 % — SIGNIFICANT CHANGE UP (ref 34.5–45)
HGB BLD-MCNC: 13.9 G/DL — SIGNIFICANT CHANGE UP (ref 11.5–15.5)
HYALINE CASTS # UR AUTO: 1 /LPF — SIGNIFICANT CHANGE UP (ref 0–2)
IMM GRANULOCYTES NFR BLD AUTO: 0.7 % — SIGNIFICANT CHANGE UP (ref 0–1.5)
INR BLD: 1.03 RATIO — SIGNIFICANT CHANGE UP (ref 0.88–1.16)
KETONES UR-MCNC: NEGATIVE — SIGNIFICANT CHANGE UP
LEUKOCYTE ESTERASE UR-ACNC: NEGATIVE — SIGNIFICANT CHANGE UP
LYMPHOCYTES # BLD AUTO: 1.95 K/UL — SIGNIFICANT CHANGE UP (ref 1–3.3)
LYMPHOCYTES # BLD AUTO: 18.2 % — SIGNIFICANT CHANGE UP (ref 13–44)
MCHC RBC-ENTMCNC: 29.5 PG — SIGNIFICANT CHANGE UP (ref 27–34)
MCHC RBC-ENTMCNC: 32 GM/DL — SIGNIFICANT CHANGE UP (ref 32–36)
MCV RBC AUTO: 92.1 FL — SIGNIFICANT CHANGE UP (ref 80–100)
MONOCYTES # BLD AUTO: 0.71 K/UL — SIGNIFICANT CHANGE UP (ref 0–0.9)
MONOCYTES NFR BLD AUTO: 6.6 % — SIGNIFICANT CHANGE UP (ref 2–14)
NEUTROPHILS # BLD AUTO: 7.68 K/UL — HIGH (ref 1.8–7.4)
NEUTROPHILS NFR BLD AUTO: 71.6 % — SIGNIFICANT CHANGE UP (ref 43–77)
NITRITE UR-MCNC: NEGATIVE — SIGNIFICANT CHANGE UP
NRBC # BLD: 0 /100 WBCS — SIGNIFICANT CHANGE UP (ref 0–0)
NT-PROBNP SERPL-SCNC: 200 PG/ML — SIGNIFICANT CHANGE UP (ref 0–300)
PH UR: 6 — SIGNIFICANT CHANGE UP (ref 5–8)
PLATELET # BLD AUTO: 196 K/UL — SIGNIFICANT CHANGE UP (ref 150–400)
POTASSIUM SERPL-MCNC: 4.7 MMOL/L — SIGNIFICANT CHANGE UP (ref 3.5–5.3)
POTASSIUM SERPL-SCNC: 4.7 MMOL/L — SIGNIFICANT CHANGE UP (ref 3.5–5.3)
PROT SERPL-MCNC: 6.7 G/DL — SIGNIFICANT CHANGE UP (ref 6–8.3)
PROT UR-MCNC: ABNORMAL
PROTHROM AB SERPL-ACNC: 12.3 SEC — SIGNIFICANT CHANGE UP (ref 10.6–13.6)
RBC # BLD: 4.71 M/UL — SIGNIFICANT CHANGE UP (ref 3.8–5.2)
RBC # FLD: 14.1 % — SIGNIFICANT CHANGE UP (ref 10.3–14.5)
RBC CASTS # UR COMP ASSIST: 8 /HPF — HIGH (ref 0–4)
SARS-COV-2 RNA SPEC QL NAA+PROBE: SIGNIFICANT CHANGE UP
SODIUM SERPL-SCNC: 141 MMOL/L — SIGNIFICANT CHANGE UP (ref 135–145)
SP GR SPEC: 1.03 — HIGH (ref 1.01–1.02)
TROPONIN T, HIGH SENSITIVITY RESULT: 13 NG/L — SIGNIFICANT CHANGE UP (ref 0–51)
TROPONIN T, HIGH SENSITIVITY RESULT: 14 NG/L — SIGNIFICANT CHANGE UP (ref 0–51)
UROBILINOGEN FLD QL: NEGATIVE — SIGNIFICANT CHANGE UP
WBC # BLD: 10.72 K/UL — HIGH (ref 3.8–10.5)
WBC # FLD AUTO: 10.72 K/UL — HIGH (ref 3.8–10.5)
WBC UR QL: 3 /HPF — SIGNIFICANT CHANGE UP (ref 0–5)

## 2021-06-14 PROCEDURE — 93010 ELECTROCARDIOGRAM REPORT: CPT

## 2021-06-14 PROCEDURE — 99285 EMERGENCY DEPT VISIT HI MDM: CPT | Mod: CS

## 2021-06-14 RX ORDER — MIRTAZAPINE 45 MG/1
15 TABLET, ORALLY DISINTEGRATING ORAL AT BEDTIME
Refills: 0 | Status: DISCONTINUED | OUTPATIENT
Start: 2021-06-14 | End: 2021-06-15

## 2021-06-14 RX ORDER — DONEPEZIL HYDROCHLORIDE 10 MG/1
10 TABLET, FILM COATED ORAL AT BEDTIME
Refills: 0 | Status: DISCONTINUED | OUTPATIENT
Start: 2021-06-14 | End: 2021-06-15

## 2021-06-14 RX ORDER — QUETIAPINE FUMARATE 200 MG/1
25 TABLET, FILM COATED ORAL DAILY
Refills: 0 | Status: DISCONTINUED | OUTPATIENT
Start: 2021-06-14 | End: 2021-06-15

## 2021-06-14 RX ORDER — MEMANTINE HYDROCHLORIDE 10 MG/1
10 TABLET ORAL
Refills: 0 | Status: DISCONTINUED | OUTPATIENT
Start: 2021-06-14 | End: 2021-06-15

## 2021-06-14 RX ORDER — QUETIAPINE FUMARATE 200 MG/1
12.5 TABLET, FILM COATED ORAL
Qty: 0 | Refills: 0 | DISCHARGE

## 2021-06-14 RX ORDER — METOPROLOL TARTRATE 50 MG
25 TABLET ORAL DAILY
Refills: 0 | Status: DISCONTINUED | OUTPATIENT
Start: 2021-06-14 | End: 2021-06-15

## 2021-06-14 RX ORDER — LOSARTAN POTASSIUM 100 MG/1
50 TABLET, FILM COATED ORAL DAILY
Refills: 0 | Status: DISCONTINUED | OUTPATIENT
Start: 2021-06-14 | End: 2021-06-15

## 2021-06-14 RX ORDER — LANOLIN ALCOHOL/MO/W.PET/CERES
5 CREAM (GRAM) TOPICAL AT BEDTIME
Refills: 0 | Status: DISCONTINUED | OUTPATIENT
Start: 2021-06-14 | End: 2021-06-15

## 2021-06-14 RX ORDER — ATORVASTATIN CALCIUM 80 MG/1
10 TABLET, FILM COATED ORAL AT BEDTIME
Refills: 0 | Status: DISCONTINUED | OUTPATIENT
Start: 2021-06-14 | End: 2021-06-15

## 2021-06-14 RX ORDER — HEPARIN SODIUM 5000 [USP'U]/ML
5000 INJECTION INTRAVENOUS; SUBCUTANEOUS EVERY 12 HOURS
Refills: 0 | Status: DISCONTINUED | OUTPATIENT
Start: 2021-06-14 | End: 2021-06-15

## 2021-06-14 RX ADMIN — Medication 25 MILLIGRAM(S): at 19:13

## 2021-06-14 RX ADMIN — Medication 5 MILLIGRAM(S): at 21:46

## 2021-06-14 RX ADMIN — HEPARIN SODIUM 5000 UNIT(S): 5000 INJECTION INTRAVENOUS; SUBCUTANEOUS at 19:13

## 2021-06-14 RX ADMIN — ATORVASTATIN CALCIUM 10 MILLIGRAM(S): 80 TABLET, FILM COATED ORAL at 21:46

## 2021-06-14 RX ADMIN — LOSARTAN POTASSIUM 50 MILLIGRAM(S): 100 TABLET, FILM COATED ORAL at 19:13

## 2021-06-14 RX ADMIN — MIRTAZAPINE 15 MILLIGRAM(S): 45 TABLET, ORALLY DISINTEGRATING ORAL at 23:46

## 2021-06-14 RX ADMIN — QUETIAPINE FUMARATE 25 MILLIGRAM(S): 200 TABLET, FILM COATED ORAL at 19:13

## 2021-06-14 RX ADMIN — Medication 150 MILLIGRAM(S): at 23:46

## 2021-06-14 RX ADMIN — DONEPEZIL HYDROCHLORIDE 10 MILLIGRAM(S): 10 TABLET, FILM COATED ORAL at 21:46

## 2021-06-14 RX ADMIN — MEMANTINE HYDROCHLORIDE 10 MILLIGRAM(S): 10 TABLET ORAL at 19:13

## 2021-06-14 NOTE — ED PROVIDER NOTE - OBJECTIVE STATEMENT
89 F w/ hx of dementia, HTN, HLD, presents to the ER w/ an episode of CP. Pt lives at home w/ her daughter who reports the pt was complaining of cp in the middle of the chest and jaw pain at approx 730 AM. Daughter had just given her clindamycin for a dental infection. Pt then had persistent sx, was given 2 tums, pain persistent, was then given ASA 81 mg by daughter (vane) pt then bibems.   PT had jaw pain 2 days prior. No associated sob, no associated diaphoresis, +nausea no vomiting, no diarrhea.   PT BIBEMS was given additional asa, and nitro.

## 2021-06-14 NOTE — ED PROVIDER NOTE - PROGRESS NOTE DETAILS
no answer for Dr hernández attempt to admit to [facility] for trop trending, care endorsed to Dr. Elmore, for further monitoring

## 2021-06-14 NOTE — H&P ADULT - NSHPLABSRESULTS_GEN_ALL_CORE
LABS:                        13.9   10.72 )-----------( 196      ( 2021 09:48 )             43.4     06-14    141  |  108  |  24<H>  ----------------------------<  97  4.7   |  18<L>  |  1.38<H>    Ca    10.2      2021 09:48    TPro  6.7  /  Alb  4.0  /  TBili  0.3  /  DBili  x   /  AST  25  /  ALT  12  /  AlkPhos  88  06-14    PT/INR - ( 2021 12:24 )   PT: 12.3 sec;   INR: 1.03 ratio         PTT - ( 2021 12:24 )  PTT:22.5 sec  CAPILLARY BLOOD GLUCOSE        CARDIAC MARKERS ( 2021 09:48 )  x     / x     / 44 U/L / x     / 1.0 ng/mL      Urinalysis Basic - ( 2021 09:50 )    Color: Yellow / Appearance: Clear / S.029 / pH: x  Gluc: x / Ketone: Negative  / Bili: Negative / Urobili: Negative   Blood: x / Protein: Trace / Nitrite: Negative   Leuk Esterase: Negative / RBC: 8 /hpf / WBC 3 /HPF   Sq Epi: x / Non Sq Epi: 2 /hpf / Bacteria: Negative        RADIOLOGY & ADDITIONAL TESTS:    < from: Xray Chest 1 View AP/PA (21 @ 09:32) >    IMPRESSION:    The heart is normal in size. The lungs are clear. No pleural effusion. No pneumothorax. No acute bony pathology could be identified. The bones appear to be demineralized.    < end of copied text >    < from: CT Abdomen and Pelvis No Cont (21 @ 11:48) >    IMPRESSION:  Limited noncontrast study.    No CT evidence of acute intra-abdominal pathology.    Moderate stool in the rectal vault. Correlate clinically for fecal impaction/constipation. Normal appendix.    A nonobstructing right renal stone.    Contour change of the right ovary with question underlying subcentimeter lesion. Consider more definitive characterization with pelvic ultrasound.    < end of copied text >        Imaging Personally Reviewed:  [x] YES  [ ] NO    Consultant(s) Notes Reviewed:  [x] YES  [ ] NO    Care Discussed with Consultants/Other Providers [x] YES  [ ] NO

## 2021-06-14 NOTE — CONSULT NOTE ADULT - ASSESSMENT
Patient seen and examined, agree with above assessment and plan as transcribed above.    - Cannot recall the exact details of her CP appear to be related to taking medication  - NO exertional symptoms  - F/U echo      Rafa Jackson MD, [facility]  BEEPER [phone]

## 2021-06-14 NOTE — H&P ADULT - ASSESSMENT
Patient is a 89 year old female with PMHx of dementia, CVA and HTN BIBEMS from home complaining of midsternal chest pain. Patient lives home with daughter who reports that patient was complaining of chest and jaw pain this morning after administering her Clindamycin for a recent dental infection. Symptoms were still persistent so daughter then gave patient 2 Tums as well as 81MG of ASA. Upon arrival to ED patient was hypertensive to 200s, given 1 tab of sublingual nitro as well as 324MG ASA. Patient denies sob, n/v/d, headache, blurry vision.    # Chest pain:  s/p sublingual nitro x1 and 324MG ASA in ED  EKG: NSR  Monitor trops: 14 -->13  Admit to tele  Appreciate cards consult    MUNDO:  Baseline Cr 1.10 as per sunrise   Cr elevated on admission to 1.38  Avoid nephrotoxic meds  Monitor BMP     # HTN:  Hypertensive on admission  Monitor BP  Cont Losartan 50MG and Metroprolol 25MG     # Cerebrovascular accident (CVA) due to other mechanism:  Prior hx of hemorrhagic stroke 15 yrs ago  Continue Atorvastatin    # Alzheimer's dementia with behavioral disturbance, unspecified timing of dementia onset:  Associated w/ hallucinations  Cont home med Namenda 10MG BID, Aricept 10MG and Quetiapine 12.5MG QHS    # DVT ppx:  Heparin subq Q12H       Patient is a 89 year old female with PMHx of dementia, CVA and HTN BIBEMS from home complaining of midsternal chest pain. Patient lives home with daughter who reports that patient was complaining of chest and jaw pain this morning after administering her Clindamycin for a recent dental infection. Symptoms were still persistent so daughter then gave patient 2 Tums as well as 81MG of ASA. Upon arrival to ED patient was hypertensive to 200s, given 1 tab of sublingual nitro as well as 324MG ASA. Patient denies sob, n/v/d, headache, blurry vision.    # Chest pain:  s/p sublingual nitro x1 and 324MG ASA in ED  EKG: NSR  Monitor trops: 14 -->13  Admit to tele  Appreciate cards consult    MUNDO:  Baseline Cr 1.10 as per sunrise   Cr elevated on admission to 1.38  Avoid nephrotoxic meds  Monitor BMP     # Dental Infection:  Recent dental work   Cont Clindamycin 150MG TID    # HTN:  Hypertensive on admission  Monitor BP  Cont home Losartan 50MG and Metroprolol 25MG with hold parameters     # Cerebrovascular accident (CVA) due to other mechanism:  Prior hx of hemorrhagic stroke 15 yrs ago  Cont home Atorvastatin 10MG QHS    # Alzheimer's dementia with behavioral disturbance, unspecified timing of dementia onset:  Associated w/ hallucinations  Cont home Namenda 10MG BID, Aricept 10MG QHS and Quetiapine 25MG daily    # DVT ppx:  Heparin subq Q12H

## 2021-06-14 NOTE — ED ADULT NURSE NOTE - NSIMPLEMENTINTERV_GEN_ALL_ED
Implemented All Fall with Harm Risk Interventions:  [place] to call system. Call bell, personal items and telephone within reach. Instruct patient to call for assistance. Room bathroom lighting operational. Non-slip footwear when patient is off stretcher. Physically safe environment: no spills, clutter or unnecessary equipment. Stretcher in lowest position, wheels locked, appropriate side rails in place. Provide visual cue, wrist band, yellow gown, etc. Monitor gait and stability. Monitor for mental status changes and reorient to person, place, and time. Review medications for side effects contributing to fall risk. Reinforce activity limits and safety measures with patient and family. Provide visual clues: red socks.

## 2021-06-14 NOTE — ED PROVIDER NOTE - PHYSICAL EXAMINATION
Const: Well-nourished, Well-developed, appearing stated age  Head: atraumatic, normocephalic  Eyes: no conjunctival injection and no scleral icterus  ENMT: Atraumatic external nose and ears, Moist mucus membranes  Neck: Symmetric, trachea midline,  CVS: +S1/S2, dorsalis pedis/radial pulse 2+ bilaterally  RESP: Unlabored respiratory effort, Clear to auscultation bilaterally  GI: Nontender/Nondistended, soft abdomen  MSK: Extremities w/o deformity or ttp, no lower extremity edema, mild tenderness when palpating the arms   Skin: Warm, Dry w/ no rashes  Neuro: GCS=14 motor in all 4 extremities equal, Sensation intact in all 4 extremities  Psych: Awake, Alert, & Orientedx3;  Appropriate mood and affect, cooperative Const: Well-nourished, Well-developed, appearing stated age  Head: atraumatic, normocephalic  Eyes: no conjunctival injection and no scleral icterus  ENMT: Atraumatic external nose and ears, Moist mucus membranes  Neck: Symmetric, trachea midline,  CVS: +S1/S2, dorsalis pedis/radial pulse 2+ bilaterally  RESP: Unlabored respiratory effort, Clear to auscultation bilaterally  GI: Nontender/Nondistended, soft abdomen  MSK: Extremities w/o deformity or ttp, no lower extremity edema, mild tenderness when palpating the arms   Skin: Warm, Dry w/ no rashes  Neuro: GCS=14 motor in all 4 extremities equal, Sensation intact in all 4 extremities  Psych: Awake, Alert, & Orientedx2;  Appropriate mood and affect, cooperative Const: Well-nourished, Well-developed, appearing stated age  Head: atraumatic, normocephalic  Eyes: no conjunctival injection and no scleral icterus  ENMT: Atraumatic external nose and ears, Moist mucus membranes  Neck: Symmetric, trachea midline,  CVS: +S1/S2, dorsalis pedis/radial pulse 2+ bilaterally  RESP: Unlabored respiratory effort, Clear to auscultation bilaterally  GI: mild RLQ tenderness, Nondistended, soft abdomen  BACK: no cva tednerness no c/t/l spine tenderness  MSK: Extremities w/o deformity or ttp, no lower extremity edema, mild tenderness when palpating the arms   Skin: Warm, Dry w/ no rashes  Neuro: GCS=14 motor in all 4 extremities equal, Sensation intact in all 4 extremities  Psych: Awake, Alert, & Orientedx2;  Appropriate mood and affect, cooperative

## 2021-06-14 NOTE — H&P ADULT - NSICDXPASTMEDICALHX_GEN_ALL_CORE_FT
PAST MEDICAL HISTORY:  Dementia     Hypertension     Macular degeneration     Melanoma     Stroke

## 2021-06-14 NOTE — ED PROVIDER NOTE - NS ED ROS FT
Constitutional: no fevers no chills.   CV: + chest pain, no palpitations   Respiratory: no shortness of breath, no cough   GI: no abdominal pain, no nausea no vomiting

## 2021-06-14 NOTE — ED PROVIDER NOTE - CLINICAL SUMMARY MEDICAL DECISION MAKING FREE TEXT BOX
89 F w/ hx of dementia, HTN, HLD, p/w cp and jaw pain started at aprox 730, sx are improved, pt bibems given asa 324 and nitro 1SL, on exam, pt w/ RLQ tenderness, clear lungs, no lower extremity edema, discussion w/ daughter regarding admission. will speak w/ pts PCP.

## 2021-06-14 NOTE — H&P ADULT - NSHPSOCIALHISTORY_GEN_ALL_CORE
denies alcohol/tobacco  Since Covid 19, has left independent living place and lives with daughters Franchesca and Selene (alternatives between)  does not use assistive devices for ambulation

## 2021-06-14 NOTE — CONSULT NOTE ADULT - SUBJECTIVE AND OBJECTIVE BOX
HISTORY OF PRESENT ILLNESS: HPI:  Patient is a 89 y/o female with PMH of dementia, HTN and HLD who is admitted with midsternal chest pain. Cardiology consulted for further evaluation. Patient lives home with daughter who reports that patient was complaining of chest and jaw pain this morning after administering her Clindamycin for a recent dental infection. Symptoms were still persistent so daughter then gave patient 2 Tums as well as 81MG of ASA. Upon arrival to ED patient was hypertensive to 200s, given 1 tab of sublingual nitro as well as 324MG ASA. Patient currently denies chest pain. Denies chest pain with exertion. No prior cardiac history. Denies SOB, palpitations, dizziness, or syncope.    PAST MEDICAL & SURGICAL HISTORY:  Melanoma    Macular degeneration    Hypertension    Stroke    Dementia    Status post Mohs surgery  melanoma    MEDICATIONS:  MEDICATIONS  (STANDING):  atorvastatin 10 milliGRAM(s) Oral at bedtime  clindamycin   Capsule 150 milliGRAM(s) Oral three times a day  donepezil 10 milliGRAM(s) Oral at bedtime  heparin   Injectable 5000 Unit(s) SubCutaneous every 12 hours  losartan 50 milliGRAM(s) Oral daily  melatonin 5 milliGRAM(s) Oral at bedtime  memantine 10 milliGRAM(s) Oral two times a day  metoprolol tartrate 25 milliGRAM(s) Oral daily  mirtazapine 15 milliGRAM(s) Oral at bedtime  QUEtiapine 25 milliGRAM(s) Oral daily      Allergies    penicillin (Other)    Intolerances      FAMILY HISTORY:  No family history of cerebrovascular accident (CVA)      Non-contributary for premature coronary disease or sudden cardiac death    SOCIAL HISTORY:    [x ] Non-smoker  [ ] Smoker  [ ] Alcohol    FLU VACCINE THIS YEAR STARTS IN AUGUST:  [ ] Yes    [ ] No    IF OVER 65 HAVE YOU EVER HAD A PNA VACCINE:  [ ] Yes    [ ] No       [ ] N/A      REVIEW OF SYSTEMS:  [x ]chest pain  [  ]shortness of breath  [  ]palpitations  [  ]syncope  [ ]near syncope [ ]upper extremity weakness   [ ] lower extremity weakness  [  ]diplopia  [  ]altered mental status   [  ]fevers  [ ]chills [ ]nausea  [ ]vomitting  [  ]dysphagia    [ ]abdominal pain  [ ]melena  [ ]BRBPR    [  ]epistaxis  [  ]rash    [ ]lower extremity edema        [x ] All others negative	  [ ] Unable to obtain      LABS:	 	    CARDIAC MARKERS:  CARDIAC MARKERS ( 14 Jun 2021 09:48 )  x     / x     / 44 U/L / x     / 1.0 ng/mL                              13.9   10.72 )-----------( 196      ( 14 Jun 2021 09:48 )             43.4     Hb Trend: 13.9<--    06-14    141  |  108  |  24<H>  ----------------------------<  97  4.7   |  18<L>  |  1.38<H>    Ca    10.2      14 Jun 2021 09:48    TPro  6.7  /  Alb  4.0  /  TBili  0.3  /  DBili  x   /  AST  25  /  ALT  12  /  AlkPhos  88  06-14    Creatinine Trend: 1.38<--    Coags:  PT/INR - ( 14 Jun 2021 12:24 )   PT: 12.3 sec;   INR: 1.03 ratio         PTT - ( 14 Jun 2021 12:24 )  PTT:22.5 sec    proBNP: Serum Pro-Brain Natriuretic Peptide: 200 pg/mL (06-14 @ 09:48)    Lipid Profile:   HgA1c:   TSH:     PHYSICAL EXAM:  T(C): 36.7 (06-14-21 @ 12:14), Max: 37.1 (06-14-21 @ 09:32)  HR: 78 (06-14-21 @ 13:02) (60 - 78)  BP: 161/71 (06-14-21 @ 13:02) (155/65 - 165/80)  RR: 16 (06-14-21 @ 13:02) (16 - 20)  SpO2: 100% (06-14-21 @ 13:02) (98% - 100%)  Wt(kg): --   BMI (kg/m2): 25.6 (06-14-21 @ 08:58)  I&O's Summary      Gen: Appears well in NAD  HEENT:  (-)icterus (-)pallor  CV: N S1 S2 1/6 IRENA (+)2 Pulses B/l  Resp:  Clear to auscultation B/L, normal effort  GI: (+) BS Soft, NT, ND  Lymph:  (-)Edema, (-)obvious lymphadenopathy  Skin: Warm to touch, Normal turgor  Psych: Appropriate mood and affect    TELEMETRY: 	      ECG: NSR, septal infarct age undetermined	    RADIOLOGY:         CXR:  < from: Xray Chest 1 View AP/PA (06.14.21 @ 09:32) >  IMPRESSION:    The heart is normal in size. The lungs are clear. No pleural effusion. No pneumothorax. No acute bony pathology could be identified. The bones appear to be demineralized.    < end of copied text >      ASSESSMENT/PLAN: Patient is a 89 y/o female with PMH of dementia, HTN and HLD who is admitted with midsternal chest pain. Cardiology consulted for further evaluation.     - Atypical nonanginal chest pain now resolved  - MI ruled out, no acute ischemic EKG changes  - Check TTE to eval LV function and r/o structural abnormalities    Rhys Kuhn PA-C  Pager: [phone]

## 2021-06-14 NOTE — H&P ADULT - HISTORY OF PRESENT ILLNESS
Patient is a 89 year old female with PMHx of dementia, HTN and HLD BIBEMS from home complaining of midsternal chest pain. Patient lives home with daughter who reports that patient was complaining of chest and jaw pain this morning after administering her Clindamycin for a recent dental infection. Symptoms were still persistent so daughter then gave patient 2 Tums as well as 81MG of ASA. Upon arrival to ED patient was hypertensive to 200s, given 1 tab of sublingual nitro as well as 324MG ASA. Patient denies sob, n/v/d, headache, blurry vision.

## 2021-06-14 NOTE — H&P ADULT - NSHPREVIEWOFSYSTEMS_GEN_ALL_CORE
GENERAL: no weakness, no fever/chills, no weight loss/gain  EYES/ENT: No visual changes, no vertigo or throat pain  NECK: No pain or stiffness   RESPIRATORY: no cough, no wheezing, no hemoptysis, no dyspnea, no shortness of breath  CARDIOVASCULAR: + chest pain or palpitations  GASTROINTESTINAL: no n/v/d, no abdominal or epigastric pain  GENITOURINARY: no dysuria, no frequency, no nocturia, no hematuria  MUSCULOSKELETAL: no trauma, no sprain/strain, no myalgias, no arthralgias, no fracture  NEUROLOGICAL: no HA, no dizziness, no weakness, no numbness  SKIN: No itching, rashes

## 2021-06-14 NOTE — H&P ADULT - ATTENDING COMMENTS
89 year old female with PMHx of dementia, HTN and HLD BIBEMS from home complaining of midsternal chest pain.  Also found to have MUNDO.  Presentation of chest pain is atypical.  First two trops are negative, and there are no acute ST changes in the EKG.  Admit to tele to continue rule out ACS.  Given her age and MUNDO, she would be a poor cath candidate and would be managed conservatively if she was found to have evidence for atherosclerotic heart disease.  Will consult cardiology.  Cont metoprolol, atorvastatin, and lisinopril.

## 2021-06-14 NOTE — H&P ADULT - NSHPPHYSICALEXAM_GEN_ALL_CORE
Vital Signs Last 24 Hrs  T(C): 36.7 (14 Jun 2021 12:14), Max: 37.1 (14 Jun 2021 09:32)  T(F): 98 (14 Jun 2021 12:14), Max: 98.7 (14 Jun 2021 09:32)  HR: 78 (14 Jun 2021 13:02) (60 - 78)  BP: 161/71 (14 Jun 2021 13:02) (155/65 - 165/80)  BP(mean): --  RR: 16 (14 Jun 2021 13:02) (16 - 20)  SpO2: 100% (14 Jun 2021 13:02) (98% - 100%)    PHYSICAL EXAM:  GENERAL: NAD, well-developed, comfortable  HEAD:  Atraumatic, Normocephalic  EYES: EOMI, PERRLA, conjunctiva and sclera clear  NECK: Supple, No JVD  CHEST/LUNG: Diminished BS bilaterally; No wheeze  HEART: Regular rate and rhythm; No murmurs, rubs, or gallops  ABDOMEN: Soft, Nontender, Nondistended; Bowel sounds present  NEURO: AAOx2, no focal weakness, 5/5 b/l extremity strength, b/l knee no arthritis, no effusion   EXTREMITIES:  2+ Peripheral Pulses, No clubbing, cyanosis, or edema  SKIN: No rashes or lesions

## 2021-06-14 NOTE — ED ADULT NURSE NOTE - OBJECTIVE STATEMENT
89 yo female, disoriented to time PMH HTN, HLD, CVA BIB EMS from home for midsternal CP starting this morning. Per daughter at bedside, she gave 89 yo female, disoriented to time PMH Dementia, HTN, HLD, CVA BIB EMS from home for midsternal CP starting this morning. Per daughter at bedside, pain started after clindamycin administration, currently taking for dental infection. Upon EMS arrival, pt was hypertensive 200s/120s, given 1 tab of SL nitro and 324mg ASA PTA. CM applied, EKG NSR. Denies SOB, n/v/d, fevers, chills, urinary symptoms, weakness, fatigue, numbness, tingling in upper and lower extremities, HA, blurry vision. VSS updated on plan of care.

## 2021-06-15 ENCOUNTER — TRANSCRIPTION ENCOUNTER (OUTPATIENT)
Age: 86
End: 2021-06-15

## 2021-06-15 VITALS
HEART RATE: 55 BPM | OXYGEN SATURATION: 98 % | RESPIRATION RATE: 18 BRPM | DIASTOLIC BLOOD PRESSURE: 72 MMHG | SYSTOLIC BLOOD PRESSURE: 164 MMHG | TEMPERATURE: 97 F

## 2021-06-15 LAB
ANION GAP SERPL CALC-SCNC: 13 MMOL/L — SIGNIFICANT CHANGE UP (ref 5–17)
BUN SERPL-MCNC: 21 MG/DL — SIGNIFICANT CHANGE UP (ref 7–23)
CALCIUM SERPL-MCNC: 10.3 MG/DL — SIGNIFICANT CHANGE UP (ref 8.4–10.5)
CHLORIDE SERPL-SCNC: 103 MMOL/L — SIGNIFICANT CHANGE UP (ref 96–108)
CO2 SERPL-SCNC: 23 MMOL/L — SIGNIFICANT CHANGE UP (ref 22–31)
COVID-19 SPIKE DOMAIN AB INTERP: POSITIVE
COVID-19 SPIKE DOMAIN ANTIBODY RESULT: >250 U/ML — HIGH
CREAT SERPL-MCNC: 1.31 MG/DL — HIGH (ref 0.5–1.3)
CULTURE RESULTS: NO GROWTH — SIGNIFICANT CHANGE UP
GLUCOSE SERPL-MCNC: 94 MG/DL — SIGNIFICANT CHANGE UP (ref 70–99)
HCT VFR BLD CALC: 42.2 % — SIGNIFICANT CHANGE UP (ref 34.5–45)
HGB BLD-MCNC: 13.6 G/DL — SIGNIFICANT CHANGE UP (ref 11.5–15.5)
MCHC RBC-ENTMCNC: 29.4 PG — SIGNIFICANT CHANGE UP (ref 27–34)
MCHC RBC-ENTMCNC: 32.2 GM/DL — SIGNIFICANT CHANGE UP (ref 32–36)
MCV RBC AUTO: 91.3 FL — SIGNIFICANT CHANGE UP (ref 80–100)
NRBC # BLD: 0 /100 WBCS — SIGNIFICANT CHANGE UP (ref 0–0)
PLATELET # BLD AUTO: 206 K/UL — SIGNIFICANT CHANGE UP (ref 150–400)
POTASSIUM SERPL-MCNC: 4.2 MMOL/L — SIGNIFICANT CHANGE UP (ref 3.5–5.3)
POTASSIUM SERPL-SCNC: 4.2 MMOL/L — SIGNIFICANT CHANGE UP (ref 3.5–5.3)
RBC # BLD: 4.62 M/UL — SIGNIFICANT CHANGE UP (ref 3.8–5.2)
RBC # FLD: 14 % — SIGNIFICANT CHANGE UP (ref 10.3–14.5)
SARS-COV-2 IGG+IGM SERPL QL IA: >250 U/ML — HIGH
SARS-COV-2 IGG+IGM SERPL QL IA: POSITIVE
SODIUM SERPL-SCNC: 139 MMOL/L — SIGNIFICANT CHANGE UP (ref 135–145)
SPECIMEN SOURCE: SIGNIFICANT CHANGE UP
WBC # BLD: 12.14 K/UL — HIGH (ref 3.8–10.5)
WBC # FLD AUTO: 12.14 K/UL — HIGH (ref 3.8–10.5)

## 2021-06-15 PROCEDURE — 83880 ASSAY OF NATRIURETIC PEPTIDE: CPT

## 2021-06-15 PROCEDURE — 93306 TTE W/DOPPLER COMPLETE: CPT

## 2021-06-15 PROCEDURE — 86769 SARS-COV-2 COVID-19 ANTIBODY: CPT

## 2021-06-15 PROCEDURE — 85730 THROMBOPLASTIN TIME PARTIAL: CPT

## 2021-06-15 PROCEDURE — 80053 COMPREHEN METABOLIC PANEL: CPT

## 2021-06-15 PROCEDURE — 74176 CT ABD & PELVIS W/O CONTRAST: CPT

## 2021-06-15 PROCEDURE — 80048 BASIC METABOLIC PNL TOTAL CA: CPT

## 2021-06-15 PROCEDURE — 81001 URINALYSIS AUTO W/SCOPE: CPT

## 2021-06-15 PROCEDURE — 82553 CREATINE MB FRACTION: CPT

## 2021-06-15 PROCEDURE — 71045 X-RAY EXAM CHEST 1 VIEW: CPT

## 2021-06-15 PROCEDURE — 85027 COMPLETE CBC AUTOMATED: CPT

## 2021-06-15 PROCEDURE — 93005 ELECTROCARDIOGRAM TRACING: CPT

## 2021-06-15 PROCEDURE — 85610 PROTHROMBIN TIME: CPT

## 2021-06-15 PROCEDURE — 84484 ASSAY OF TROPONIN QUANT: CPT

## 2021-06-15 PROCEDURE — 87086 URINE CULTURE/COLONY COUNT: CPT

## 2021-06-15 PROCEDURE — 82550 ASSAY OF CK (CPK): CPT

## 2021-06-15 PROCEDURE — 85025 COMPLETE CBC W/AUTO DIFF WBC: CPT

## 2021-06-15 PROCEDURE — G0378: CPT

## 2021-06-15 PROCEDURE — U0003: CPT

## 2021-06-15 PROCEDURE — 99285 EMERGENCY DEPT VISIT HI MDM: CPT | Mod: 25

## 2021-06-15 PROCEDURE — U0005: CPT

## 2021-06-15 RX ORDER — ACETAMINOPHEN 500 MG
650 TABLET ORAL ONCE
Refills: 0 | Status: COMPLETED | OUTPATIENT
Start: 2021-06-15 | End: 2021-06-15

## 2021-06-15 RX ADMIN — Medication 650 MILLIGRAM(S): at 05:23

## 2021-06-15 RX ADMIN — MEMANTINE HYDROCHLORIDE 10 MILLIGRAM(S): 10 TABLET ORAL at 05:23

## 2021-06-15 RX ADMIN — Medication 150 MILLIGRAM(S): at 13:05

## 2021-06-15 RX ADMIN — Medication 25 MILLIGRAM(S): at 05:23

## 2021-06-15 RX ADMIN — LOSARTAN POTASSIUM 50 MILLIGRAM(S): 100 TABLET, FILM COATED ORAL at 05:23

## 2021-06-15 RX ADMIN — Medication 150 MILLIGRAM(S): at 05:23

## 2021-06-15 RX ADMIN — QUETIAPINE FUMARATE 25 MILLIGRAM(S): 200 TABLET, FILM COATED ORAL at 10:47

## 2021-06-15 RX ADMIN — HEPARIN SODIUM 5000 UNIT(S): 5000 INJECTION INTRAVENOUS; SUBCUTANEOUS at 07:17

## 2021-06-15 NOTE — PROGRESS NOTE ADULT - ATTENDING COMMENTS
Pt seen and examined on 6/15/21    Agree with ACP note as above    Vitals/imaging/labs were reviewed    My physical examination of the patient corresponded to that documented above except as noted    89 year old female with PMHx of dementia, CVA and HTN BIBEMS from home complaining of midsternal chest pain.  Ruled for ACS.  Cont tele.  Awaiting TTE before deciding on need for ischemic eval.  Appreciate cardiology.

## 2021-06-15 NOTE — PROGRESS NOTE ADULT - ASSESSMENT
Patient is a 89 year old female with PMHx of dementia, CVA and HTN BIBEMS from home complaining of midsternal chest pain. Patient lives home with daughter who reports that patient was complaining of chest and jaw pain this morning after administering her Clindamycin for a recent dental infection. Symptoms were still persistent so daughter then gave patient 2 Tums as well as 81MG of ASA. Upon arrival to ED patient was hypertensive to 200s, given 1 tab of sublingual nitro as well as 324MG ASA. Patient denies sob, n/v/d, headache, blurry vision.    # Chest pain:  s/p sublingual nitro x1 and 324MG ASA in ED  EKG: NSR  Monitor trops: 14 -->13  Atypical nonanginal chest pain now resolved  MI ruled out, no acute ischemic EKG changes  Follow up TTE to eval LV function and r/o structural abnormalities  Follow up cards recs    MUNDO:  Baseline Cr 1.10 as per sunrise   Cr elevated on admission to 1.38  Avoid nephrotoxic meds  Monitor BMP     # Dental Infection:  Recent dental work   Cont Clindamycin 150MG TID    # HTN:  Hypertensive on admission  Monitor BP  Cont home Losartan 50MG and Metroprolol 25MG with hold parameters     # Cerebrovascular accident (CVA) due to other mechanism:  Prior hx of hemorrhagic stroke 15 yrs ago  Cont home Atorvastatin 10MG QHS    # Alzheimer's dementia with behavioral disturbance, unspecified timing of dementia onset:  Associated w/ hallucinations  Cont home Namenda 10MG BID, Aricept 10MG QHS and Quetiapine 25MG daily    # DVT ppx:  Heparin subq Q12H      
Agree with above assessment and plan as outlined above.    - nonanginal symptoms  - Given advanced age and dementia will forgo ischemic eval as she could not cooperate with an echo    Rafa Jackson MD, [facility]  BEEPER [phone]

## 2021-06-15 NOTE — DISCHARGE NOTE PROVIDER - HOSPITAL COURSE
Patient is a 89 year old female with PMHx of dementia, CVA and HTN BIBEMS from home complaining of midsternal chest pain. Patient lives home with daughter who reports that patient was complaining of chest and jaw pain this morning after administering her Clindamycin for a recent dental infection. Symptoms were still persistent so daughter then gave patient 2 Tums as well as 81MG of ASA. Upon arrival to ED patient was hypertensive to 200s, given 1 tab of sublingual nitro as well as 324MG ASA. EKG -NSR - Cards consult appreciated   Atypical nonanginal chest pain now resolved - appears related to after taking medication.  MI ruled out, no acute ischemic EKG changes  Unable to complete TTE due to patient refusal however grossly preserved LV function.    # Dental Infection with recent dental work -cont and complete Clindamycin 150MG TID  No further inpatient cardiac w/u needed - stable for discharge from CV perspective with outpatient cardiology follow up in 1 week

## 2021-06-15 NOTE — DISCHARGE NOTE PROVIDER - CARE PROVIDER_API CALL
Rafa Jackson)  Cardiovascular Disease  [address]  [place] [address]  Phone: [phone]  Fax: [phone]  Follow Up Time:

## 2021-06-15 NOTE — DISCHARGE NOTE PROVIDER - NSDCCPCAREPLAN_GEN_ALL_CORE_FT
PRINCIPAL DISCHARGE DIAGNOSIS  Diagnosis: Other chest pain  Assessment and Plan of Treatment: Resolved - continue current  out-patient medication   Follow up with your cardiologist in 2 weeks

## 2021-06-15 NOTE — DISCHARGE NOTE PROVIDER - NSDCMRMEDTOKEN_GEN_ALL_CORE_FT
clindamycin 150 mg oral capsule: 1 cap(s) orally 3 times a day  donepezil 10 mg oral tablet: 1 tab(s) orally once a day (at bedtime)  losartan 50 mg oral tablet: 1 tab(s) orally once a day  melatonin 5 mg oral tablet: 1.5 tab(s) orally once a day (at bedtime)  metoprolol tartrate 25 mg oral tablet: 1 tab(s) orally once a day  mirtazapine 15 mg oral tablet: 1 tab(s) orally once a day (at bedtime)  Namenda 10 mg oral tablet: 1 tab(s) orally 2 times a day  pravastatin 20 mg oral tablet: 1 tab(s) orally once a day  QUEtiapine 25 mg oral tablet: 1 tab(s) orally once a day

## 2021-06-15 NOTE — CHART NOTE - NSCHARTNOTEFT_GEN_A_CORE
Based on my assessment, this patient’s condition has improved and no longer warrants inpatient status and will be changed to outpatient status prior to being discharged from the hospital.  This decision is based on the following reasons: Pt ruled out for ACS, there were no tele events, pt has preserved LV systolic fxn, and cardiology has deemed pt stable for any further cardiac workup as outpt.

## 2021-06-15 NOTE — PROGRESS NOTE ADULT - SUBJECTIVE AND OBJECTIVE BOX
S: Denies chest pain or SOB. Review of systems otherwise (-)    Review of Systems:   Constitutional: [ ] fevers, [ ] chills.   Skin: [ ] dry skin. [ ] rashes.  Psychiatric: [ ] depression, [ ] anxiety.   Gastrointestinal: [ ] BRBPR, [ ] melena.   Neurological: [ ] confusion. [ ] seizures. [ ] shuffling gait.   Ears,Nose,Mouth and Throat: [ ] ear pain [ ] sore throat.   Eyes: [ ] diplopia.   Respiratory: [ ] hemoptysis. [ ] shortness of breath  Cardiovascular: See HPI above  Hematologic/Lymphatic: [ ] anemia. [ ] painful nodes. [ ] prolonged bleeding.   Genitourinary: [ ] hematuria. [ ] flank pain.   Endocrine: [ ] significant change in weight. [ ] intolerance to heat and cold.     Review of systems [x ] otherwise negative, [ ] otherwise unable to obtain    FH: no family history of sudden cardiac death in first degree relatives    SH: [ ] tobacco, [ ] alcohol, [ ] drugs    atorvastatin 10 milliGRAM(s) Oral at bedtime  clindamycin   Capsule 150 milliGRAM(s) Oral three times a day  donepezil 10 milliGRAM(s) Oral at bedtime  heparin   Injectable 5000 Unit(s) SubCutaneous every 12 hours  losartan 50 milliGRAM(s) Oral daily  melatonin 5 milliGRAM(s) Oral at bedtime  memantine 10 milliGRAM(s) Oral two times a day  metoprolol tartrate 25 milliGRAM(s) Oral daily  mirtazapine 15 milliGRAM(s) Oral at bedtime  QUEtiapine 25 milliGRAM(s) Oral daily                            13.6   12.14 )-----------( 206      ( 15 [place]ico 2021 06:15 )             42.2       06-15    139  |  103  |  21  ----------------------------<  94  4.2   |  23  |  1.31<H>    Ca    10.3      15 [place] 2021 06:15    TPro  6.7  /  Alb  4.0  /  TBili  0.3  /  DBili  x   /  AST  25  /  ALT  12  /  AlkPhos  88  06-14      CARDIAC MARKERS ( 14 Jun 2021 09:48 )  x     / x     / 44 U/L / x     / 1.0 ng/mL        T(C): 36.3 (06-15-21 @ 09:03), Max: 36.7 (06-14-21 @ 16:38)  HR: 55 (06-15-21 @ 09:03) (55 - 92)  BP: 164/72 (06-15-21 @ 09:03) (144/77 - 164/72)  RR: 18 (06-15-21 @ 09:03) (16 - 18)  SpO2: 98% (06-15-21 @ 09:03) (96% - 100%)  Wt(kg): --    I&O's Summary    Gen: Appears well in NAD  HEENT:  (-)icterus (-)pallor  CV: N S1 S2 1/6 IRENA (+)2 Pulses B/l  Resp:  Clear to auscultation B/L, normal effort  GI: (+) BS Soft, NT, ND  Lymph:  (-)Edema, (-)obvious lymphadenopathy  Skin: Warm to touch, Normal turgor  Psych: Appropriate mood and affect    TELEMETRY: SB/SR 50-60s 	      ECG: NSR, septal infarct age undetermined	    < from: Transthoracic Echocardiogram (06.15.21 @ 08:36) >  Conclusions:  Technically difficult study. Patient refused to complete  the examination. Parasternal images and limited apical  views performed.  1. Normal mitral valve. Mild mitral regurgitation.  2. Aortic valve not well visualized; appears to be a  calcified trileaflet valve. Minimal aortic regurgitation.  3. Left ventricle suboptimally visualized; grossly  preserved left ventricular systolic function.  4. The right ventricle is not well visualized; grossly  normal right ventricular systolic function.    < end of copied text >      RADIOLOGY:         CXR:  < from: Xray Chest 1 View AP/PA (06.14.21 @ 09:32) >  IMPRESSION:    The heart is normal in size. The lungs are clear. No pleural effusion. No pneumothorax. No acute bony pathology could be identified. The bones appear to be demineralized.    < end of copied text >      ASSESSMENT/PLAN: Patient is a 89 y/o female with PMH of dementia, HTN and HLD who is admitted with midsternal chest pain. Cardiology consulted for further evaluation.     - Atypical nonanginal chest pain now resolved - appears related to after taking medication  - MI ruled out, no acute ischemic EKG changes  - Unable to complete TTE due to patient refusal however grossly preserved LV function  - No further inpatient cardiac w/u needed - stable for discharge from CV perspective with outpatient cardiology follow up in 1 week    Rhys Kuhn PA-C  Pager: [phone]    
SUBJECTIVE / OVERNIGHT EVENTS:    no events overnight  patient seen and examined  denies cp/sob  no n/v/d    --------------------------------------------------------------------------------------------  LABS:                        13.6   12.14 )-----------( 206      ( 15 [place] 2021 06:15 )             42.2     06-15    139  |  103  |  21  ----------------------------<  94  4.2   |  23  |  1.31<H>    Ca    10.3      15 [place] 2021 06:15    TPro  6.7  /  Alb  4.0  /  TBili  0.3  /  DBili  x   /  AST  25  /  ALT  12  /  AlkPhos  88  06-14    PT/INR - ( 2021 12:24 )   PT: 12.3 sec;   INR: 1.03 ratio         PTT - ( 2021 12:24 )  PTT:22.5 sec  CAPILLARY BLOOD GLUCOSE        CARDIAC MARKERS ( 2021 09:48 )  x     / x     / 44 U/L / x     / 1.0 ng/mL      Urinalysis Basic - ( 2021 09:50 )    Color: Yellow / Appearance: Clear / S.029 / pH: x  Gluc: x / Ketone: Negative  / Bili: Negative / Urobili: Negative   Blood: x / Protein: Trace / Nitrite: Negative   Leuk Esterase: Negative / RBC: 8 /hpf / WBC 3 /HPF   Sq Epi: x / Non Sq Epi: 2 /hpf / Bacteria: Negative        RADIOLOGY & ADDITIONAL TESTS:    Imaging Personally Reviewed:  [x] YES  [ ] NO    Consultant(s) Notes Reviewed:  [x] YES  [ ] NO    MEDICATIONS  (STANDING):  atorvastatin 10 milliGRAM(s) Oral at bedtime  clindamycin   Capsule 150 milliGRAM(s) Oral three times a day  donepezil 10 milliGRAM(s) Oral at bedtime  heparin   Injectable 5000 Unit(s) SubCutaneous every 12 hours  losartan 50 milliGRAM(s) Oral daily  melatonin 5 milliGRAM(s) Oral at bedtime  memantine 10 milliGRAM(s) Oral two times a day  metoprolol tartrate 25 milliGRAM(s) Oral daily  mirtazapine 15 milliGRAM(s) Oral at bedtime  QUEtiapine 25 milliGRAM(s) Oral daily    MEDICATIONS  (PRN):      Care Discussed with Consultants/Other Providers [x] YES  [ ] NO    Vital Signs Last 24 Hrs  T(C): 36.3 (15 [place] 2021 09:03), Max: 36.7 (2021 12:14)  T(F): 97.3 (15 [place] 2021 09:03), Max: 98.1 (2021 16:38)  HR: 55 (15 [place] 2021 09:03) (55 - 92)  BP: 164/72 (15 [place] 2021 09:03) (144/77 - 164/72)  BP(mean): --  RR: 18 (15 [place] 2021 09:03) (16 - 18)  SpO2: 98% (15 [place] 2021 09:03) (96% - 100%)  I&O's Summary    PHYSICAL EXAM:  GENERAL: NAD, well-developed, comfortable  HEAD:  Atraumatic, Normocephalic  EYES: EOMI, PERRLA, conjunctiva and sclera clear  NECK: Supple, No JVD  CHEST/LUNG: Diminished BS bilaterally; No wheeze  HEART: Regular rate and rhythm; No murmurs, rubs, or gallops  ABDOMEN: Soft, Nontender, Nondistended; Bowel sounds present  NEURO: AAOx2, no focal weakness, 5/5 b/l extremity strength, b/l knee no arthritis, no effusion   EXTREMITIES:  2+ Peripheral Pulses, No clubbing, cyanosis, or edema  SKIN: No rashes or lesions

## 2021-06-15 NOTE — DISCHARGE NOTE NURSING/CASE MANAGEMENT/SOCIAL WORK - PATIENT PORTAL LINK FT
You can access the FollowMyHealth Patient Portal offered by [facility] by registering at the following website: [email or web address]. By joining [facility]’s FollowMyHealth portal, you will also be able to view your health information using other applications (apps) compatible with our system.

## 2021-06-22 RX ORDER — MEMANTINE HYDROCHLORIDE 10 MG/1
1 TABLET ORAL
Qty: 0 | Refills: 0 | DISCHARGE

## 2021-06-22 RX ORDER — LANOLIN ALCOHOL/MO/W.PET/CERES
1.5 CREAM (GRAM) TOPICAL
Qty: 0 | Refills: 0 | DISCHARGE

## 2021-06-22 RX ORDER — LOSARTAN POTASSIUM 100 MG/1
1 TABLET, FILM COATED ORAL
Qty: 0 | Refills: 0 | DISCHARGE

## 2021-06-22 RX ORDER — QUETIAPINE FUMARATE 200 MG/1
1 TABLET, FILM COATED ORAL
Qty: 0 | Refills: 0 | DISCHARGE

## 2021-07-12 PROCEDURE — 99215 OFFICE O/P EST HI 40 MIN: CPT | Mod: 95

## 2021-08-25 PROCEDURE — 99215 OFFICE O/P EST HI 40 MIN: CPT | Mod: 95

## 2021-09-24 PROCEDURE — 99215 OFFICE O/P EST HI 40 MIN: CPT | Mod: 95

## 2021-10-11 ENCOUNTER — TRANSCRIPTION ENCOUNTER (OUTPATIENT)
Age: 86
End: 2021-10-11

## 2021-11-18 PROCEDURE — 99215 OFFICE O/P EST HI 40 MIN: CPT | Mod: 95

## 2021-12-04 ENCOUNTER — TRANSCRIPTION ENCOUNTER (OUTPATIENT)
Age: 86
End: 2021-12-04

## 2021-12-16 PROCEDURE — 99215 OFFICE O/P EST HI 40 MIN: CPT | Mod: 95

## 2022-01-26 PROCEDURE — 99215 OFFICE O/P EST HI 40 MIN: CPT | Mod: 95

## 2022-03-16 PROCEDURE — 99214 OFFICE O/P EST MOD 30 MIN: CPT | Mod: 95

## 2022-03-24 ENCOUNTER — TRANSCRIPTION ENCOUNTER (OUTPATIENT)
Age: 87
End: 2022-03-24

## 2022-09-26 NOTE — ED ADULT NURSE NOTE - NS ED NURSE RECORD ANOTHER HT AND WT
Yes Adbry Pregnancy And Lactation Text: It is unknown if this medication will adversely affect pregnancy or breast feeding.

## 2022-10-10 PROCEDURE — 99214 OFFICE O/P EST MOD 30 MIN: CPT | Mod: 95

## 2022-11-10 PROCEDURE — 99214 OFFICE O/P EST MOD 30 MIN: CPT | Mod: 95

## 2022-12-17 NOTE — ED ADULT NURSE NOTE - NSSISCREENINGQ1_ED_A_ED
[de-identified] : (No

## 2024-07-26 NOTE — ED PROVIDER NOTE - NS_EDPROVIDERDISPOUSERTYPE_ED_A_ED
Attending Attestation (For Attendings USE Only)...
Defer fluid needs to MD/Team.   Unable to access [facility] at this time. Per previous RD note 4/3/24, UBW 41.2kg, current wt 44.7kg (7/26/24) Weight increased x3 months (8.4%). Requested to obtain current weight, to verify wt accuracy and weight trend.   Ideal Body Weight: 98lbs / 44.5kg +/-10%
